# Patient Record
Sex: MALE | Race: WHITE | Employment: FULL TIME | URBAN - METROPOLITAN AREA
[De-identification: names, ages, dates, MRNs, and addresses within clinical notes are randomized per-mention and may not be internally consistent; named-entity substitution may affect disease eponyms.]

---
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
## 2018-08-21 ENCOUNTER — OFFICE VISIT (OUTPATIENT)
Dept: FAMILY MEDICINE | Facility: OTHER | Age: 18
End: 2018-08-21
Payer: COMMERCIAL

## 2018-08-21 ENCOUNTER — TELEPHONE (OUTPATIENT)
Dept: FAMILY MEDICINE | Facility: OTHER | Age: 18
End: 2018-08-21

## 2018-08-21 VITALS
HEART RATE: 64 BPM | TEMPERATURE: 97.5 F | DIASTOLIC BLOOD PRESSURE: 62 MMHG | HEIGHT: 68 IN | BODY MASS INDEX: 32.02 KG/M2 | WEIGHT: 211.3 LBS | SYSTOLIC BLOOD PRESSURE: 104 MMHG | RESPIRATION RATE: 16 BRPM

## 2018-08-21 DIAGNOSIS — S43.431D TEAR OF RIGHT GLENOID LABRUM, SUBSEQUENT ENCOUNTER: ICD-10-CM

## 2018-08-21 DIAGNOSIS — K21.00 GASTROESOPHAGEAL REFLUX DISEASE WITH ESOPHAGITIS: ICD-10-CM

## 2018-08-21 DIAGNOSIS — Z01.818 PREOP GENERAL PHYSICAL EXAM: Primary | ICD-10-CM

## 2018-08-21 DIAGNOSIS — Z11.3 SCREENING EXAMINATION FOR VENEREAL DISEASE: ICD-10-CM

## 2018-08-21 LAB
ALBUMIN UR-MCNC: NEGATIVE MG/DL
APPEARANCE UR: CLEAR
BILIRUB UR QL STRIP: NEGATIVE
COLOR UR AUTO: YELLOW
GLUCOSE UR STRIP-MCNC: NEGATIVE MG/DL
HGB BLD-MCNC: 15 G/DL (ref 13.3–17.7)
HGB UR QL STRIP: NEGATIVE
KETONES UR STRIP-MCNC: NEGATIVE MG/DL
LEUKOCYTE ESTERASE UR QL STRIP: NEGATIVE
NITRATE UR QL: NEGATIVE
PH UR STRIP: 7 PH (ref 5–7)
SOURCE: NORMAL
SP GR UR STRIP: 1.02 (ref 1–1.03)
UROBILINOGEN UR STRIP-ACNC: 0.2 EU/DL (ref 0.2–1)

## 2018-08-21 PROCEDURE — 36415 COLL VENOUS BLD VENIPUNCTURE: CPT | Performed by: STUDENT IN AN ORGANIZED HEALTH CARE EDUCATION/TRAINING PROGRAM

## 2018-08-21 PROCEDURE — 85018 HEMOGLOBIN: CPT | Performed by: STUDENT IN AN ORGANIZED HEALTH CARE EDUCATION/TRAINING PROGRAM

## 2018-08-21 PROCEDURE — 81003 URINALYSIS AUTO W/O SCOPE: CPT | Performed by: STUDENT IN AN ORGANIZED HEALTH CARE EDUCATION/TRAINING PROGRAM

## 2018-08-21 PROCEDURE — 87491 CHLMYD TRACH DNA AMP PROBE: CPT | Performed by: STUDENT IN AN ORGANIZED HEALTH CARE EDUCATION/TRAINING PROGRAM

## 2018-08-21 PROCEDURE — 99214 OFFICE O/P EST MOD 30 MIN: CPT | Performed by: STUDENT IN AN ORGANIZED HEALTH CARE EDUCATION/TRAINING PROGRAM

## 2018-08-21 PROCEDURE — 87591 N.GONORRHOEAE DNA AMP PROB: CPT | Performed by: STUDENT IN AN ORGANIZED HEALTH CARE EDUCATION/TRAINING PROGRAM

## 2018-08-21 RX ORDER — OMEPRAZOLE 40 MG/1
40 CAPSULE, DELAYED RELEASE ORAL DAILY
Qty: 90 CAPSULE | Refills: 1 | COMMUNITY
Start: 2018-08-21 | End: 2019-09-16

## 2018-08-21 ASSESSMENT — PAIN SCALES - GENERAL: PAINLEVEL: SEVERE PAIN (6)

## 2018-08-21 NOTE — LETTER
My Depression Action Plan  Name: Alf Multani   Date of Birth 2000  Date: 8/20/2018    My doctor: Alhaji Madison   My clinic: [facility]  [address] [place] [address] [address] [phone]          GREEN    ZONE   Good Control    What it looks like:     Things are going generally well. You have normal up s and down s. You may even feel depressed from time to time, but bad moods usually last less than a day.   What you need to do:  1. Continue to care for yourself (see self care plan)  2. Check your depression survival kit and update it as needed  3. Follow your physician s recommendations including any medication.  4. Do not stop taking medication unless you consult with your physician first.           YELLOW         ZONE Getting Worse    What it looks like:     Depression is starting to interfere with your life.     It may be hard to get out of bed; you may be starting to isolate yourself from others.    Symptoms of depression are starting to last most all day and this has happened for several days.     You may have suicidal thoughts but they are not constant.   What you need to do:     1. Call your care team, your response to treatment will improve if you keep your care team informed of your progress. Yellow periods are signs an adjustment may need to be made.     2. Continue your self-care, even if you have to fake it!    3. Talk to someone in your support network    4. Open up your depression survival kit           RED    ZONE Medical Alert - Get Help    What it looks like:     Depression is seriously interfering with your life.     You may experience these or other symptoms: You can t get out of bed most days, can t work or engage in other necessary activities, you have trouble taking care of basic hygiene, or basic responsibilities, thoughts of suicide or death that will not go away, self-injurious behavior.     What you need to do:  1. Call your care  team and request a same-day appointment. If they are not available (weekends or after hours) call your local crisis line, emergency room or 911.            Depression Self Care Plan / Survival Kit    Self-Care for Depression  Here s the deal. Your body and mind are really not as separate as most people think.  What you do and think affects how you feel and how you feel influences what you do and think. This means if you do things that people who feel good do, it will help you feel better.  Sometimes this is all it takes.  There is also a place for medication and therapy depending on how severe your depression is, so be sure to consult with your medical provider and/ or Behavioral Health Consultant if your symptoms are worsening or not improving.     In order to better manage my stress, I will:    Exercise  Get some form of exercise, every day. This will help reduce pain and release endorphins, the  feel good  chemicals in your brain. This is almost as good as taking antidepressants!  This is not the same as joining a gym and then never going! (they count on that by the way ) It can be as simple as just going for a walk or doing some gardening, anything that will get you moving.      Hygiene   Maintain good hygiene (Get out of bed in the morning, Make your bed, Brush your teeth, Take a shower, and Get dressed like you were going to work, even if you are unemployed).  If your clothes don't fit try to get ones that do.    Diet  I will strive to eat foods that are good for me, drink plenty of water, and avoid excessive sugar, caffeine, alcohol, and other mood-altering substances.  Some foods that are helpful in depression are: complex carbohydrates, B vitamins, flaxseed, fish or fish oil, fresh fruits and vegetables.    Psychotherapy  I agree to participate in Individual Therapy (if recommended).    Medication  If prescribed medications, I agree to take them.  Missing doses can result in serious side effects.  I  understand that drinking alcohol, or other illicit drug use, may cause potential side effects.  I will not stop my medication abruptly without first discussing it with my provider.    Staying Connected With Others  I will stay in touch with my friends, family members, and my primary care provider/team.    Use your imagination  Be creative.  We all have a creative side; it doesn t matter if it s oil painting, sand castles, or mud pies! This will also kick up the endorphins.    Witness Beauty  (AKA stop and smell the roses) Take a look outside, even in mid-winter. Notice colors, textures. Watch the squirrels and birds.     Service to others  Be of service to others.  There is always someone else in need.  By helping others we can  get out of ourselves  and remember the really important things.  This also provides opportunities for practicing all the other parts of the program.    Humor  Laugh and be silly!  Adjust your TV habits for less news and crime-drama and more comedy.    Control your stress  Try breathing deep, massage therapy, biofeedback, and meditation. Find time to relax each day.     My support system    Clinic Contact:  Phone number:    Contact 1:  Phone number:    Contact 2:  Phone number:    Uatsdin/:  Phone number:    Therapist:  Phone number:    Local crisis center:    Phone number:    Other community support:  Phone number:

## 2018-08-21 NOTE — MR AVS SNAPSHOT
After Visit Summary   8/21/2018    Alf Multani    MRN: [id number]           Patient Information     Date Of Birth          2000        Visit Information        Provider Department      8/21/2018 8:20 AM Laura England APRN CNP [facility]        Today's Diagnoses     Preop general physical exam    -  1    Tear of right glenoid labrum, subsequent encounter        Screening examination for venereal disease        Gastroesophageal reflux disease with esophagitis          Care Instructions      Before Your Surgery      Call your surgeon if there is any change in your health. This includes signs of a cold or flu (such as a sore throat, runny nose, cough, rash or fever).    Do not smoke, drink alcohol or take over the counter medicine (unless your surgeon or primary care doctor tells you to) for the 24 hours before and after surgery.    If you take prescribed drugs: Follow your doctor s orders about which medicines to take and which to stop until after surgery.    Eating and drinking prior to surgery: follow the instructions from your surgeon    Take a shower or bath the night before surgery. Use the soap your surgeon gave you to gently clean your skin. If you do not have soap from your surgeon, use your regular soap. Do not shave or scrub the surgery site.  Wear clean pajamas and have clean sheets on your bed.           Follow-ups after your visit        Who to contact     If you have questions or need follow up information about today's clinic visit or your schedule please contact [facility] directly at [phone].  Normal or non-critical lab and imaging results will be communicated to you by MyChart, letter or phone within 4 business days after the clinic has received the results. If you do not hear from us within 7 days, please contact the clinic through MyChart or phone. If you have a critical or abnormal lab result, we will notify you by phone  "as soon as possible.  Submit refill requests through [employer] or call your pharmacy and they will forward the refill request to us. Please allow 3 business days for your refill to be completed.          Additional Information About Your Visit        Care EveryWhere ID     This is your Care EveryWhere ID. This could be used by other organizations to access your [facility] medical records  [id number]        Your Vitals Were     Pulse Temperature Respirations Height BMI (Body Mass Index)       64 97.5  F (36.4  C) (Temporal) 16 5' 8.35\" (1.736 m) 31.8 kg/m2        Blood Pressure from Last 3 Encounters:   08/21/18 104/62   09/08/16 114/60   08/26/16 122/80    Weight from Last 3 Encounters:   08/21/18 211 lb 4.8 oz (95.8 kg) (95 %)*   09/08/16 178 lb 8 oz (81 kg) (85 %)*   08/26/16 178 lb (80.7 kg) (85 %)*     * Growth percentiles are based on [facility] 2-20 Years data.              We Performed the Following     *UA reflex to Microscopic and Culture ([facility] (except [place] and [place])     CHLAMYDIA TRACHOMATIS PCR     Hemoglobin     NEISSERIA GONORRHOEA PCR        Primary Care Provider Office Phone # Fax #    Alhaji Los Madison,  [phone]       [address] [place] [address]        Equal Access to Services     MARISSA GRANT : Hadii rita verma hadasho Soomaali, waaxda luqadaha, qaybta kaalmada matteo, fili stewart. So [facility] [phone].    ATENCIÓN: Si habla español, tiene a beasley disposición servicios gratuitos de asistencia lingüística. Hakan al [phone].    We comply with applicable federal civil rights laws and [place] laws. We do not discriminate on the basis of race, color, national origin, age, disability, sex, sexual orientation, or gender identity.            Thank you!     Thank you for choosing [facility]  for your care. Our goal is always to provide you with excellent care. Hearing back from our patients is one " way we can continue to improve our services. Please take a few minutes to complete the written survey that you may receive in the mail after your visit with us. Thank you!             Your Updated Medication List - Protect others around you: Learn how to safely use, store and throw away your medicines at www.disposemymeds.org.          This list is accurate as of 8/21/18 10:35 AM.  Always use your most recent med list.                   Brand Name Dispense Instructions for use Diagnosis    omeprazole 40 MG capsule    priLOSEC    90 capsule    Take 1 capsule (40 mg) by mouth daily Take 30-60 minutes before a meal.    Gastroesophageal reflux disease with esophagitis

## 2018-08-22 LAB
C TRACH DNA SPEC QL NAA+PROBE: NEGATIVE
N GONORRHOEA DNA SPEC QL NAA+PROBE: NEGATIVE
SPECIMEN SOURCE: NORMAL
SPECIMEN SOURCE: NORMAL

## 2019-08-18 ENCOUNTER — TRANSFERRED RECORDS (OUTPATIENT)
Dept: HEALTH INFORMATION MANAGEMENT | Facility: CLINIC | Age: 19
End: 2019-08-18

## 2019-09-16 ENCOUNTER — HOSPITAL ENCOUNTER (EMERGENCY)
Facility: CLINIC | Age: 19
Discharge: HOME | End: 2019-09-17
Attending: FAMILY MEDICINE | Admitting: FAMILY MEDICINE
Payer: COMMERCIAL

## 2019-09-16 VITALS
DIASTOLIC BLOOD PRESSURE: 87 MMHG | TEMPERATURE: 99 F | SYSTOLIC BLOOD PRESSURE: 165 MMHG | RESPIRATION RATE: 16 BRPM | OXYGEN SATURATION: 100 %

## 2019-09-16 DIAGNOSIS — R36.1 HEMATOSPERMIA: ICD-10-CM

## 2019-09-16 PROCEDURE — 99282 EMERGENCY DEPT VISIT SF MDM: CPT | Mod: Z6 | Performed by: FAMILY MEDICINE

## 2019-09-16 PROCEDURE — 99283 EMERGENCY DEPT VISIT LOW MDM: CPT | Performed by: FAMILY MEDICINE

## 2019-09-16 NOTE — ED AVS SNAPSHOT
[facility] Emergency Department  [address] [place] [address]  Phone:  [phone]  Fax:  [phone]                                    Alf Multani   MRN: [id number]    Department:  [facility] Emergency Department   Date of Visit:  9/16/2019           After Visit Summary Signature Page    I have received my discharge instructions, and my questions have been answered. I have discussed any challenges I see with this plan with the nurse or doctor.    ..........................................................................................................................................  Patient/Patient Representative Signature      ..........................................................................................................................................  Patient Representative Print Name and Relationship to Patient    ..................................................               ................................................  Date                                   Time    ..........................................................................................................................................  Reviewed by Signature/Title    ...................................................              ..............................................  Date                                               Time          22EPIC Rev 08/18

## 2019-09-16 NOTE — LETTER
[facility]  Emergency Room  [address] [place]   [address]  Tel: [phone]   Fax: [phone]  2019    Alf Multani  [address]  [place] [facility] [address]  [phone] (home)     : 2000          To Whom it May Concern:    Alf Multani was seen in our ER tnight, 2019. He may return to workl, without restriction, when improved.      Please contact me for questions or concerns.    Sincerely,       Jc Pearson MD

## 2019-09-17 LAB
ALBUMIN UR-MCNC: NEGATIVE MG/DL
APPEARANCE UR: CLEAR
BILIRUB UR QL STRIP: NEGATIVE
COLOR UR AUTO: YELLOW
GLUCOSE UR STRIP-MCNC: NEGATIVE MG/DL
HGB UR QL STRIP: NEGATIVE
KETONES UR STRIP-MCNC: NEGATIVE MG/DL
LEUKOCYTE ESTERASE UR QL STRIP: NEGATIVE
NITRATE UR QL: NEGATIVE
PH UR STRIP: 6 PH (ref 5–7)
SOURCE: NORMAL
SP GR UR STRIP: 1.02 (ref 1–1.03)
UROBILINOGEN UR STRIP-MCNC: 0 MG/DL (ref 0–2)

## 2019-09-17 PROCEDURE — 81003 URINALYSIS AUTO W/O SCOPE: CPT | Performed by: FAMILY MEDICINE

## 2019-09-17 PROCEDURE — 87086 URINE CULTURE/COLONY COUNT: CPT | Performed by: FAMILY MEDICINE

## 2019-09-17 NOTE — ED PROVIDER NOTES
ED Provider Note     Alf Multani  [id number]  September 17, 2019      CC:     Chief Complaint   Patient presents with     see note          History is obtained from the patient.    HPI: Alf Multani is a 19 year old male presenting with 2 episodes of hematospermia with initial episode last October, and again last night.  Patient was masturbating, and had a streak of blood with his semen.  It was not painful, and he has no other symptoms.  He denies any urinary symptoms such as dysuria or frequency.  He has no prior history of prostate problems.  Patient is sexually active with a prior history of 5-10 partners in the past.  He has no prior history of STDs.    PMH/Problem List:   Past Medical History:   Diagnosis Date     Acute appendicitis      ADHD (attention deficit hyperactivity disorder)      Anxiety        PSH:   Past Surgical History:   Procedure Laterality Date     LAPAROSCOPIC APPENDECTOMY  5/28/2012    Procedure:LAPAROSCOPIC APPENDECTOMY; Laparoscopic Appendectomy; Surgeon:INOCENCIO WOODSON; Location: OR     ORTHOPEDIC SURGERY         MEDS:     No current facility-administered medications on file prior to encounter.   Current Outpatient Medications on File Prior to Encounter:  Pantoprazole Sodium (PROTONIX PO)        Allergies: Penicillins    Triage and nursing notes were reviewed.    ROS: All other systems were reviewed and are negative    Physical Exam:  Vitals:    09/16/19 2316   BP: (!) 165/87   Resp: 16   Temp: 99  F (37.2  C)   TempSrc: Oral   SpO2: 100%     GENERAL APPEARANCE: Alert and oriented x3, slightly anxious, no apparent distress  HEAD: atraumatic  EYES: PER  HENT: Normal external exam  NECK: no adenopathy or masses, trachea is midline  RESP: lungs clear to auscultation - no rales, rhonchi or wheezes  CV: regular rate and rhythm, no significant murmurs or rubs  ABDOMEN: soft, nontender, no masses with normal bowel  sounds  : Normal external male genitalia; no discharge no genital lesions; testes are normal.  No signs of hernia  SKIN: no rash; as above      Labs/Imaging Results:  Results for orders placed or performed during the hospital encounter of 09/16/19 (from the past 24 hour(s))   UA reflex to Microscopic   Result Value Ref Range    Color Urine Yellow     Appearance Urine Clear     Glucose Urine Negative NEG^Negative mg/dL    Bilirubin Urine Negative NEG^Negative    Ketones Urine Negative NEG^Negative mg/dL    Specific Gravity Urine 1.024 1.003 - 1.035    Blood Urine Negative NEG^Negative    pH Urine 6.0 5.0 - 7.0 pH    Protein Albumin Urine Negative NEG^Negative mg/dL    Urobilinogen mg/dL 0.0 0.0 - 2.0 mg/dL    Nitrite Urine Negative NEG^Negative    Leukocyte Esterase Urine Negative NEG^Negative    Source Midstream Urine            Impression:  Final diagnoses:   Hematospermia         ED Course & Medical Decision Making (Plan):  Alf Multani is a 19 year old male with 2 episodes of hematospermia with initial episode last October and again last night.  Patient was embarrassed to come in, but decided to come in tonight to get checked.  He was masturbating both times.  He has a prior history of multiple sexual partners with women.  Patient has no prior history of STDs.  He has no other symptoms including urinary symptoms.  Patient was seen shortly after arrival.  Vital signs were normal.  Blood pressure elevated 165/87.  Patient appears slightly anxious.   exam was unremarkable.  Urinalysis was normal.  Patient was reassured that the streaks of blood with his ejaculate his likely benign given the lack of any other history and physical findings.  I would like him to monitor for any further symptoms or associated symptoms.  Follow-up with his primary care provider as needed.    Written after-visit summary and instructions were given at the time of discharge.          Follow Up Plan:  Alhaji Madison,  DO  [address] [place] [address]   [facility] [address]  [phone]      As needed            Disclaimer: This note consists of words and symbols derived from keyboarding and dictation using voice recognition software.  As a result, there may be errors that have gone undetected.  Please consider this when interpreting information found in this note.       Ken Pearson MD  09/17/19 0107

## 2019-09-17 NOTE — DISCHARGE INSTRUCTIONS
There does not appear to be a serious cause for your symptoms.  Follow-up with your primary care provider if you have any further episodes.  Return to the emergency department at any time if you develop any other symptoms.

## 2019-09-17 NOTE — ED TRIAGE NOTES
Pt presents with concerns of blood in his sperm.  Pt states that it happened on Sunday and happened one other time a while ago.  Pt states that it was not a lot of blood, but is concerned.  Denies pain.  Denies difficulty with urination.

## 2019-09-18 ENCOUNTER — TELEPHONE (OUTPATIENT)
Dept: FAMILY MEDICINE | Facility: OTHER | Age: 19
End: 2019-09-18

## 2019-09-18 LAB
BACTERIA SPEC CULT: NO GROWTH
Lab: NORMAL
SPECIMEN SOURCE: NORMAL

## 2019-09-18 NOTE — TELEPHONE ENCOUNTER
"Reason for Call:  Same Day Appointment, Requested Provider:  Alhaji Madison DO    PCP: Alhaji Madison    Reason for visit: ER f/u \"personal\"     Duration of symptoms: Was in the ER Monday     Have you been treated for this in the past? Yes    Additional comments: Pt is hoping to be worked in today. Please call.     Can we leave a detailed message on this number? YES    Phone number patient can be reached at: Home number on file [phone] (home)    Best Time: any     Call taken on 9/18/2019 at 11:49 AM by Christy Louis    "

## 2019-09-18 NOTE — TELEPHONE ENCOUNTER
Spoke with patient and informed of message below. Appointment made for tomorrow.     Little Scherer MA

## 2019-09-18 NOTE — TELEPHONE ENCOUNTER
"I am afraid I do not have any openings today to accommodate his \"special problem\".  Please try to place him in 1 of the available slots tomorrow.    Thank you.    Gricelda"

## 2019-09-19 ENCOUNTER — OFFICE VISIT (OUTPATIENT)
Dept: FAMILY MEDICINE | Facility: OTHER | Age: 19
End: 2019-09-19
Payer: COMMERCIAL

## 2019-09-19 VITALS
RESPIRATION RATE: 18 BRPM | TEMPERATURE: 98.6 F | HEIGHT: 69 IN | OXYGEN SATURATION: 98 % | HEART RATE: 86 BPM | SYSTOLIC BLOOD PRESSURE: 126 MMHG | BODY MASS INDEX: 28.93 KG/M2 | WEIGHT: 195.3 LBS | DIASTOLIC BLOOD PRESSURE: 68 MMHG

## 2019-09-19 DIAGNOSIS — R36.1 HEMATOSPERMIA: Primary | ICD-10-CM

## 2019-09-19 PROCEDURE — 99213 OFFICE O/P EST LOW 20 MIN: CPT | Performed by: INTERNAL MEDICINE

## 2019-09-19 ASSESSMENT — MIFFLIN-ST. JEOR: SCORE: 1878.07

## 2019-09-19 ASSESSMENT — PAIN SCALES - GENERAL: PAINLEVEL: NO PAIN (0)

## 2019-09-19 NOTE — LETTER
[facility]  [address] [facility] [address]  Phone: [phone]    September 19, 2019        Alf Multani  [address]  [facility] [address]          To whom it may concern:    RE: Alf Multani    Patient was seen and treated today at our clinic and missed work yesterday and today.    He can return on 9/20/19 without restrictions.    Please contact me for questions or concerns.      Sincerely,        Alhaji Madison, DO

## 2019-09-19 NOTE — PROGRESS NOTES
Subjective     Alf Multani is a 19 year old male who presents to clinic today for the following health issues:    HPI   ED/UC Followup:    Facility:  [facility]   Date of visit: 9/16/2019  Reason for visit: Hematospermia   Current Status: Still has pain at times                          Chief Complaint         The patient is applying a pleasant 19-year-old male who is seen for ER follow-up.  He was recently seen for auto induced manipulative penile trauma which resulted in a small quantity of hematospermia.  He describes it is a mild streak on the lateral edge of his ejaculate and only on 2 occasions.  He notes that as of late he has been increasing his frequency of auto manipulation and this may be partly causative.  He has not had any spontaneous bleeding or discoloration of the undergarments or blood in the urine.  He denies frequency, urgency or dysuria but notes that it does temporarily delay his initial stream.  This has been a phenomenon for the last couple days.  He denies any change in partners (or partners) and has had no other trauma or injury.                       PAST, FAMILY,SOCIAL HISTORY:     Medical  History:   has a past medical history of Acute appendicitis, ADHD (attention deficit hyperactivity disorder), and Anxiety.     Surgical History:   has a past surgical history that includes Laparoscopic appendectomy (5/28/2012) and orthopedic surgery.     Social History:   reports that he has quit smoking. He has never used smokeless tobacco. He reports that he does not drink alcohol or use drugs.     Family History:  family history includes Psychotic Disorder in his father.            MEDICATIONS  Current Outpatient Medications   Medication Sig Dispense Refill     Pantoprazole Sodium (PROTONIX PO)            --------------------------------------------------------------------------------------------------------------------                              Review of Systems       LUNGS: Pt denies:  cough, excess sputum, hemoptysis, or shortness of breath.   HEART: Pt denies: chest pain, arrhythmia, syncope, tachy or bradyarrhythmia.   GI: Pt denies: nausea, vomiting, diarrhea, constipation, melena, or hematochezia.   NEURO: Pt denies: seizures, strokes, diplopia, weakness, paraesthesias, or paralysis.   SKIN: Pt denies: itching, rashes, discoloration, or specific lesions of concern. Denies recent hair loss.   PSYCH: The patient denies significant depression, anxiety, mood imbalance. Specifically denies any suicidal ideation.                                     Examination       Constitutional: The patient appears to be in no acute distress. The patient appears to be adequately hydrated. No acute respiratory or hemodynamic distress is noted at this time.   LUNGS: clear bilaterally, airflow is brisk, no intercostal retraction or stridor is noted. No coughing is noted during visit.   HEART:  regular without rubs, clicks, gallops, or murmurs. PMI is nondisplaced. Upstrokes are brisk. S1,S2 are heard.   GI: Abdomen is soft, without rebound, guarding or tenderness. Bowel sounds are appropriate. No renal bruits are heard.    GENITAL: Normal male genitalia is noted. Testicles are bilaterally present. No penile lesions are present.                                                Decision Making  1. Hematospermia  Recommend ibuprofen 2 pills 3 times daily for 3 to 5 days for minor urethral irritation  Recommend a respite from auto manipulation for a week or so  If symptoms recur, patient will notify me (not at the moment, but the next day)            I have carefully explained the diagnosis and treatment options to the patient.  The patient has displayed an understanding of the above, and all subsequent questions were answered.        DO OLIVA Ramos    Portions of this note were produced using [employer]  Although every attempt at real-time proof reading has been made, occasional grammar/syntax  errors may have been missed.

## 2019-09-21 ENCOUNTER — TELEPHONE (OUTPATIENT)
Dept: FAMILY MEDICINE | Facility: OTHER | Age: 19
End: 2019-09-21

## 2019-09-21 DIAGNOSIS — R11.0 NAUSEA: Primary | ICD-10-CM

## 2019-09-21 NOTE — TELEPHONE ENCOUNTER
Reason for call:  Other   Patient called regarding (reason for call): prescription  Additional comments: Patient wants ondansetron prescription 4 mg. Had prescription in [place] looking to continue.  Pharmacy [facility]    Phone number to reach patient:  Home number on file [phone] (home)    Best Time:  anytime    Can we leave a detailed message on this number?  YES

## 2019-09-21 NOTE — LETTER
"[facility]  [address] [facility] [address] [phone]        September 25, 2019    Alf Multani  [address]  [facility] [address]          Dear Alf,    We have attempted to reach you by phone in regards to your care. Generally speaking, the chronic use of antiemetics without a specific reason is contraindicated.  If the patient is having \"stomach problems\" that are not responding to proton pump inhibitors, I recommend to further work-up should be performed.  He should follow-up with his preferred provider of choice for this.  In the meantime I have called over a short course of the medication but will not be refilling it until work-up has been performed.         Sincerely,        Alhaji Madison, DO                  "

## 2019-09-23 NOTE — TELEPHONE ENCOUNTER
I have attempted to contact this patient by phone with the following results: left message to return my call on answering machine.    When he returns call, please ask patient why he was given Zofran, obviously for nausea but why is he needing more?     Yanet Dickey MA     9/23/2019

## 2019-09-24 RX ORDER — ONDANSETRON 4 MG/1
4 TABLET, ORALLY DISINTEGRATING ORAL EVERY 8 HOURS PRN
Qty: 12 TABLET | Refills: 0 | Status: SHIPPED | OUTPATIENT
Start: 2019-09-24 | End: 2019-11-12

## 2019-09-24 NOTE — TELEPHONE ENCOUNTER
Called and LM for patient to call back. Please relay message to patient below.     Little Scherer MA

## 2019-09-24 NOTE — TELEPHONE ENCOUNTER
2nd attempt called and LM for patient to call back. Please relay message below.     Little Scherer MA

## 2019-09-24 NOTE — TELEPHONE ENCOUNTER
"Generally speaking, the chronic use of antiemetics without a specific reason is contraindicated.  If the patient is having \"stomach problems\" that are not responding to proton pump inhibitors, I recommend to further work-up should be performed.  He should follow-up with his preferred provider of choice for this.  In the meantime I have called over a short course of the medication but will not be refilling it until work-up has been performed.    Gricelda  "

## 2019-09-24 NOTE — TELEPHONE ENCOUNTER
Patient called back, relayed message above.   Patient states he takes medication for his stomach and if that doesn't work- he will take zofran to help make sure he doesn't throw up. Patient states he's been having issues with his stomach and takes antacids but they don't always work.        Laine Asif ~ Patient Representative  [address] [address]  [email or web address]  [email or web address]  Office:  [phone]  Fax:  [phone]

## 2019-09-25 NOTE — TELEPHONE ENCOUNTER
2nd attempt called and LM for patient to call back. Please relay below. Letter sent to patient as well.     Little Scherer MA

## 2019-10-13 ENCOUNTER — HOSPITAL ENCOUNTER (EMERGENCY)
Facility: CLINIC | Age: 19
Discharge: HOME | End: 2019-10-13
Attending: FAMILY MEDICINE | Admitting: FAMILY MEDICINE
Payer: COMMERCIAL

## 2019-10-13 VITALS
HEART RATE: 80 BPM | DIASTOLIC BLOOD PRESSURE: 80 MMHG | TEMPERATURE: 98.7 F | SYSTOLIC BLOOD PRESSURE: 129 MMHG | OXYGEN SATURATION: 99 % | RESPIRATION RATE: 20 BRPM

## 2019-10-13 DIAGNOSIS — M25.50 ARTHRALGIA, UNSPECIFIED JOINT: ICD-10-CM

## 2019-10-13 DIAGNOSIS — G47.00 INSOMNIA, UNSPECIFIED TYPE: ICD-10-CM

## 2019-10-13 LAB
ALBUMIN SERPL-MCNC: 3.8 G/DL (ref 3.4–5)
ALP SERPL-CCNC: 74 U/L (ref 40–150)
ALT SERPL W P-5'-P-CCNC: 23 U/L (ref 0–70)
ANION GAP SERPL CALCULATED.3IONS-SCNC: 7 MMOL/L (ref 3–14)
AST SERPL W P-5'-P-CCNC: 16 U/L (ref 0–45)
BASOPHILS # BLD AUTO: 0 10E9/L (ref 0–0.2)
BASOPHILS NFR BLD AUTO: 0.3 %
BILIRUB SERPL-MCNC: 0.2 MG/DL (ref 0.2–1.3)
BUN SERPL-MCNC: 20 MG/DL (ref 7–30)
CALCIUM SERPL-MCNC: 9.1 MG/DL (ref 8.5–10.1)
CHLORIDE SERPL-SCNC: 108 MMOL/L (ref 94–109)
CO2 SERPL-SCNC: 26 MMOL/L (ref 20–32)
CREAT SERPL-MCNC: 0.75 MG/DL (ref 0.66–1.25)
CRP SERPL-MCNC: 3.1 MG/L (ref 0–8)
DIFFERENTIAL METHOD BLD: NORMAL
EOSINOPHIL NFR BLD AUTO: 1.4 %
ERYTHROCYTE [DISTWIDTH] IN BLOOD BY AUTOMATED COUNT: 12.3 % (ref 10–15)
ERYTHROCYTE [SEDIMENTATION RATE] IN BLOOD BY WESTERGREN METHOD: 7 MM/H (ref 0–15)
GFR SERPL CREATININE-BSD FRML MDRD: >90 ML/MIN/{1.73_M2}
GLUCOSE SERPL-MCNC: 123 MG/DL (ref 70–99)
HCT VFR BLD AUTO: 40.3 % (ref 40–53)
HGB BLD-MCNC: 13.7 G/DL (ref 13.3–17.7)
IMM GRANULOCYTES # BLD: 0.1 10E9/L (ref 0–0.4)
IMM GRANULOCYTES NFR BLD: 0.6 %
LYMPHOCYTES # BLD AUTO: 3 10E9/L (ref 0.8–5.3)
LYMPHOCYTES NFR BLD AUTO: 29.7 %
MCH RBC QN AUTO: 28.3 PG (ref 26.5–33)
MCHC RBC AUTO-ENTMCNC: 34 G/DL (ref 31.5–36.5)
MCV RBC AUTO: 83 FL (ref 78–100)
MONOCYTES # BLD AUTO: 0.7 10E9/L (ref 0–1.3)
MONOCYTES NFR BLD AUTO: 7.1 %
NEUTROPHILS # BLD AUTO: 6.1 10E9/L (ref 1.6–8.3)
NEUTROPHILS NFR BLD AUTO: 60.9 %
NRBC # BLD AUTO: 0 10*3/UL
NRBC BLD AUTO-RTO: 0 /100
PLATELET # BLD AUTO: 288 10E9/L (ref 150–450)
POTASSIUM SERPL-SCNC: 3.7 MMOL/L (ref 3.4–5.3)
PROT SERPL-MCNC: 7.2 G/DL (ref 6.8–8.8)
RBC # BLD AUTO: 4.84 10E12/L (ref 4.4–5.9)
SODIUM SERPL-SCNC: 141 MMOL/L (ref 133–144)
WBC # BLD AUTO: 10 10E9/L (ref 4–11)

## 2019-10-13 PROCEDURE — 80053 COMPREHEN METABOLIC PANEL: CPT | Performed by: FAMILY MEDICINE

## 2019-10-13 PROCEDURE — 85652 RBC SED RATE AUTOMATED: CPT | Performed by: FAMILY MEDICINE

## 2019-10-13 PROCEDURE — 96374 THER/PROPH/DIAG INJ IV PUSH: CPT | Performed by: FAMILY MEDICINE

## 2019-10-13 PROCEDURE — 85025 COMPLETE CBC W/AUTO DIFF WBC: CPT | Performed by: FAMILY MEDICINE

## 2019-10-13 PROCEDURE — 99284 EMERGENCY DEPT VISIT MOD MDM: CPT | Mod: Z6 | Performed by: FAMILY MEDICINE

## 2019-10-13 PROCEDURE — 99284 EMERGENCY DEPT VISIT MOD MDM: CPT | Mod: 25 | Performed by: FAMILY MEDICINE

## 2019-10-13 PROCEDURE — 86140 C-REACTIVE PROTEIN: CPT | Performed by: FAMILY MEDICINE

## 2019-10-13 PROCEDURE — 25000128 H RX IP 250 OP 636: Performed by: FAMILY MEDICINE

## 2019-10-13 PROCEDURE — 25000132 ZZH RX MED GY IP 250 OP 250 PS 637: Performed by: EMERGENCY MEDICINE

## 2019-10-13 RX ORDER — KETOROLAC TROMETHAMINE 30 MG/ML
30 INJECTION, SOLUTION INTRAMUSCULAR; INTRAVENOUS ONCE
Status: COMPLETED | OUTPATIENT
Start: 2019-10-13 | End: 2019-10-13

## 2019-10-13 RX ORDER — OLANZAPINE 2.5 MG/1
10 TABLET, FILM COATED ORAL ONCE
Status: COMPLETED | OUTPATIENT
Start: 2019-10-13 | End: 2019-10-13

## 2019-10-13 RX ORDER — ZOLPIDEM TARTRATE 10 MG/1
10 TABLET ORAL
Qty: 15 TABLET | Refills: 0 | Status: SHIPPED | OUTPATIENT
Start: 2019-10-13

## 2019-10-13 RX ADMIN — KETOROLAC TROMETHAMINE 30 MG: 30 INJECTION, SOLUTION INTRAMUSCULAR at 07:07

## 2019-10-13 RX ADMIN — OLANZAPINE 10 MG: 2.5 TABLET, FILM COATED ORAL at 07:36

## 2019-10-13 NOTE — ED PROVIDER NOTES
"                                                            [facility] ED Provider Note   Patient: Alf Multani  MRN #:  [id number]  Date of Visit: October 13, 2019    CC:     Chief Complaint   Patient presents with     Pain     HPI:  Alf Multani is a 19 year old male who presented to the emergency department with insomnia, muscle tension, and joint pains.  Patient reports that he has not been able to sleep for the last 2 nights.  He has a lot on his mind, and feels that he may be having a flareup of his anxiety.  Patient states that he has \"a lot of my plate\", and his mind has been racing during the day.  Tonight he started to develop bilateral joint pains in his knuckles, elbows, knees and ankles, with the worst joints involving the elbows and knees.  He has not noticed any swelling, redness or heat, and has not had any previous episodes of this in the past.  Patient took some ibuprofen yesterday afternoon and last night, and a dose of Benadryl 2 hours ago.  He has no prior history of uriel, and states that he has been treated in the past for depression and anxiety with medication such as Prozac and Zoloft.  These did not help as he had hoped it would but he is no longer on it.  Patient denies any illicit drug use, alcohol or tobacco.  He was recently seen in the emergency department several weeks ago with hematospermia.    Problem List:  Patient Active Problem List    Diagnosis Date Noted     Acute bronchospasm 04/21/2016     Priority: Medium     Health Care Home 04/16/2015     Priority: Medium       Status:  Unable to reach  Care Coordinator:  Sylwia Allan    See Letters for [facility] Care Plan  Date:  April 16, 2015       Major depressive disorder, single episode, mild (H) 11/03/2014     Priority: Medium     Generalized anxiety disorder 11/03/2014     Priority: Medium     Marijuana abuse 10/28/2014     Priority: Medium     Depression 10/28/2014     Priority: Medium     School failure " 12/07/2013     Priority: Medium     ADHD (attention deficit hyperactivity disorder) 12/07/2013     Priority: Medium     Date diagnosed: 10/2/13  Diagnosed by:  [facility] by Anastasiya Asif PsyD  Medications tried and any medication reactions: Concerta 36mg  _________________________________  Last office visit for ADHD:  3/13/14  Current medication and dose:  Concerta 36mg  Next visit due:  Sept  Next Marla/Lázaro due: Sept           Past Medical History:   Diagnosis Date     Acute appendicitis      ADHD (attention deficit hyperactivity disorder)      Anxiety        MEDS: Pantoprazole Sodium (PROTONIX PO)  ondansetron (ZOFRAN-ODT) 4 MG ODT tab        ALLERGIES:    Allergies   Allergen Reactions     Penicillins      Rash       Past Surgical History:   Procedure Laterality Date     LAPAROSCOPIC APPENDECTOMY  5/28/2012    Procedure:LAPAROSCOPIC APPENDECTOMY; Laparoscopic Appendectomy; Surgeon:INOCENCIO WOODSON; Location:[facility] OR     ORTHOPEDIC SURGERY         Social History     Tobacco Use     Smoking status: Former Smoker     Smokeless tobacco: Never Used   Substance Use Topics     Alcohol use: No     Alcohol/week: 0.0 standard drinks     Drug use: No     Comment: daily         Review of Systems   Except as noted in HPI, all other systems were reviewed and are negative    Physical Exam     Vitals were reviewed  Patient Vitals for the past 12 hrs:   BP Temp Temp src Heart Rate Resp SpO2   10/13/19 0715 -- 98.7  F (37.1  C) -- -- -- --   10/13/19 0635 137/89 97.6  F (36.4  C) Oral 70 20 99 %     GENERAL APPEARANCE: Alert and oriented x3, no distress  FACE: normal facies  EYES: Pupils are equal  HENT: normal external exam  NECK: no adenopathy or asymmetry  RESP: normal respiratory effort; clear breath sounds bilaterally  CV: regular rate and rhythm; no significant murmurs, gallops or rubs  ABD: soft, no tenderness; no rebound or guarding; bowel sounds are normal  EXT: No calf tenderness or pitting edema; no  signs of active synovitis; no joint effusions  SKIN: no worrisome rash  NEURO: no facial droop; no focal deficits, speech is normal  PSYCH: Patient endorses feelings of anxiety      Available Lab/Imaging Results     Pending       Impression     Final diagnoses:   Insomnia, unspecified type   Arthralgia, multiple joints         ED Course & Medical Decision Making   Alf Multani is a 19 year old male who presented to the emergency department with insomnia, with symptoms that may be due to stress related anxiety.  Patient has no previous history of uriel, and he admits that he is under a a lot of stress lately.  Since last night he started to develop bilateral joint pains involving the knuckles, elbows, knees, and ankles.  He has no prior history of an autoimmune disease.  There has been no associated rash, fever, etc.  Patient was seen shortly after arrival.  Temperature is 97.6, blood pressure 137/89, heart rate of 70.  Exam of the joints are unremarkable.  There is no signs of active synovitis.  A work-up was initiated and include a CBC, competency metabolic panel, CRP and ESR.  Patient will receive a dose of Toradol 30 mg IV.  Patient will be signed out to Dr. Vallejo at the change of shift pending results of his work-up.  We discussed the possibility of being sent home with medications for his insomnia for 2 to 3 days until he can follow-up in the clinic.         Disclaimer: This note consists of words and symbols derived from keyboarding and dictation using voice recognition software.  As a result, there may be errors that have gone undetected.  Please consider this when interpreting information found in this note.       Ken Pearson MD  10/13/19 0746

## 2019-10-13 NOTE — ED PROVIDER NOTES
19-year-old male signed over to me at change of shift.  He was initially seen by Dr. Pearson for insomnia, body aches, joint pains, tension.  He had not slept in a couple of nights.  He felt agitated with racing thoughts but no hallucinations.  He has a history of anxiety and depression.  He tried ibuprofen and Benadryl without improvement.  He denied drug use alcohol or tobacco.    Initially he did not have a ride so nothing was given to him that was sedating.  He was anxious and agitated and eventually was able to get a ride from his mother.  At that point I decided to give him Zyprexa, 10 mg orally.  He was given IV Toradol by Dr. Pearson for his body aches and labs were drawn.  I reviewed those labs.    I saw and examined the patient.  He seemed to be doing fairly well.  He was a little bit agitated.  I sent him home with a prescription for a few tablets of Ambien after a risk-benefit discussion and I have asked him to follow-up with his primary care provider.     Shekhar Vallejo MD  10/13/19 0973

## 2019-10-13 NOTE — ED TRIAGE NOTES
Reports feeling anxious on Friday and was unable to sleep, then on Saturday night he states he developed aching all over and has still been unable to sleep. Pt states he took aleve yesterday about 1400 and then Benadryl at about 0400 this morning.

## 2019-10-13 NOTE — DISCHARGE INSTRUCTIONS
Return to the emergency department if you develop new or worsening symptoms.  Please follow-up with your doctor regarding your body aches and insomnia.  It would be a good idea to try meditation to help in your insomnia problem and your anxiety.  I hope you get better quickly.  Do not drive if taking Ambien.

## 2019-10-13 NOTE — ED AVS SNAPSHOT
[facility] Emergency Department  [address] [place] [address]  Phone:  [phone]  Fax:  [phone]                                    Alf Multani   MRN: [id number]    Department:  [facility] Emergency Department   Date of Visit:  10/13/2019           After Visit Summary Signature Page    I have received my discharge instructions, and my questions have been answered. I have discussed any challenges I see with this plan with the nurse or doctor.    ..........................................................................................................................................  Patient/Patient Representative Signature      ..........................................................................................................................................  Patient Representative Print Name and Relationship to Patient    ..................................................               ................................................  Date                                   Time    ..........................................................................................................................................  Reviewed by Signature/Title    ...................................................              ..............................................  Date                                               Time          22EPIC Rev 08/18

## 2019-10-17 ENCOUNTER — HOSPITAL ENCOUNTER (EMERGENCY)
Facility: CLINIC | Age: 19
Discharge: HOME | End: 2019-10-17
Attending: EMERGENCY MEDICINE | Admitting: EMERGENCY MEDICINE

## 2019-10-17 ENCOUNTER — OFFICE VISIT (OUTPATIENT)
Dept: FAMILY MEDICINE | Facility: OTHER | Age: 19
End: 2019-10-17
Payer: COMMERCIAL

## 2019-10-17 VITALS
BODY MASS INDEX: 29.41 KG/M2 | WEIGHT: 198 LBS | SYSTOLIC BLOOD PRESSURE: 110 MMHG | HEART RATE: 70 BPM | RESPIRATION RATE: 16 BRPM | OXYGEN SATURATION: 99 % | DIASTOLIC BLOOD PRESSURE: 62 MMHG

## 2019-10-17 VITALS — OXYGEN SATURATION: 100 % | HEART RATE: 66 BPM | SYSTOLIC BLOOD PRESSURE: 127 MMHG | DIASTOLIC BLOOD PRESSURE: 78 MMHG
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
## 2019-10-17 DIAGNOSIS — S46.812A TRAPEZIUS STRAIN, LEFT, INITIAL ENCOUNTER: ICD-10-CM

## 2019-10-17 DIAGNOSIS — S06.0X0A CONCUSSION WITHOUT LOSS OF CONSCIOUSNESS, INITIAL ENCOUNTER: Primary | ICD-10-CM

## 2019-10-17 PROCEDURE — 99282 EMERGENCY DEPT VISIT SF MDM: CPT | Performed by: EMERGENCY MEDICINE

## 2019-10-17 PROCEDURE — 99214 OFFICE O/P EST MOD 30 MIN: CPT | Performed by: PHYSICIAN ASSISTANT

## 2019-10-17 PROCEDURE — 99283 EMERGENCY DEPT VISIT LOW MDM: CPT | Mod: Z6 | Performed by: EMERGENCY MEDICINE

## 2019-10-17 RX ORDER — IBUPROFEN 200 MG
400 TABLET ORAL EVERY 4 HOURS PRN
COMMUNITY

## 2019-10-17 ASSESSMENT — ENCOUNTER SYMPTOMS
SHORTNESS OF BREATH: 0
FEVER: 0
ABDOMINAL PAIN: 0

## 2019-10-17 NOTE — ED AVS SNAPSHOT
[facility] Emergency Department  [address] [place] [address]  Phone:  [phone]  Fax:  [phone]                                    Alf Multani   MRN: [id number]    Department:  [facility] Emergency Department   Date of Visit:  10/17/2019           After Visit Summary Signature Page    I have received my discharge instructions, and my questions have been answered. I have discussed any challenges I see with this plan with the nurse or doctor.    ..........................................................................................................................................  Patient/Patient Representative Signature      ..........................................................................................................................................  Patient Representative Print Name and Relationship to Patient    ..................................................               ................................................  Date                                   Time    ..........................................................................................................................................  Reviewed by Signature/Title    ...................................................              ..............................................  Date                                               Time          22EPIC Rev 08/18

## 2019-10-17 NOTE — DISCHARGE INSTRUCTIONS
Fortunately your medical assessment from his work that injury shows no evidence for concussion or closed head injury.  Noted was spasm with strain to the left trapezius muscle.  Would recommend applying ice for 30 minutes 3-4 times today and using ibuprofen as needed for headache and muscle soreness.  He may return to work tomorrow without any restrictions.  Mother should be observing for any cognitive change such as confusion, vomiting(sign of increased intracranial pressure), loss of coordination.  If any of the symptoms are observed please return to the emergency department for reassessment.

## 2019-10-17 NOTE — ED TRIAGE NOTES
"A \"huge\" Plywood door fell on his head at work this morning.  Seen at [facility] and referred to ED.  Work Comp: [employer]  "

## 2019-10-17 NOTE — ED PROVIDER NOTES
History     Chief Complaint   Patient presents with     Head Injury     HPI  Alf Multani is a 19 year old male who presents for work-related injury.  He is employed in [employer].  A large plywood door used on the back of a pickup truck to capture yard cleanup fell striking him.  It initially did not knock him off his feet but he fell to ground a few seconds after being hit.  His primary complaint is headache rated 3/10 intensity and some left paracervical/left shoulder discomfort.  He has had no nausea or vomiting.  No change in balance or coordination.  Denies any visual complaints.  Notes no increased discomfort with cervical range of motion or movement of his left shoulder.  Points over left trapezius muscle as the region of soreness.    Allergies:  Allergies   Allergen Reactions     Penicillins      Rash       Problem List:    Patient Active Problem List    Diagnosis Date Noted     Acute bronchospasm 04/21/2016     Priority: Medium     Health Care Home 04/16/2015     Priority: Medium       Status:  Unable to reach  Care Coordinator:  Sylwia Allan    See Letters for H Care Plan  Date:  April 16, 2015       Major depressive disorder, single episode, mild (H) 11/03/2014     Priority: Medium     Generalized anxiety disorder 11/03/2014     Priority: Medium     Marijuana abuse 10/28/2014     Priority: Medium     Depression 10/28/2014     Priority: Medium     School failure 12/07/2013     Priority: Medium     ADHD (attention deficit hyperactivity disorder) 12/07/2013     Priority: Medium     Date diagnosed: 10/2/13  Diagnosed by:  [facility] by Anastasiya Asif PsyD  Medications tried and any medication reactions: Concerta 36mg  _________________________________  Last office visit for ADHD:  3/13/14  Current medication and dose:  Concerta 36mg  Next visit due:  Sept  Next Marla/Lázaro due: Sept            Past Medical History:    Past Medical History:   Diagnosis Date     Acute  appendicitis      ADHD (attention deficit hyperactivity disorder)      Anxiety        Past Surgical History:    Past Surgical History:   Procedure Laterality Date     LAPAROSCOPIC APPENDECTOMY  5/28/2012    Procedure:LAPAROSCOPIC APPENDECTOMY; Laparoscopic Appendectomy; Surgeon:INOCENCIO WOODSON; Location: OR     ORTHOPEDIC SURGERY         Family History:    Family History   Problem Relation Age of Onset     Psychotic Disorder Father         bipolar       Social History:  Marital Status:  Single [1]  Social History     Tobacco Use     Smoking status: Former Smoker     Packs/day: 0.00     Smokeless tobacco: Never Used   Substance Use Topics     Alcohol use: Yes     Alcohol/week: 0.0 standard drinks     Comment: occ     Drug use: No     Comment: medical marijuana        Medications:    ibuprofen (ADVIL/MOTRIN) 200 MG tablet  ondansetron (ZOFRAN-ODT) 4 MG ODT tab  Pantoprazole Sodium (PROTONIX PO)  zolpidem (AMBIEN) 10 MG tablet          Review of Systems   Constitutional: Negative for fever.   Respiratory: Negative for shortness of breath.    Cardiovascular: Negative for chest pain.   Gastrointestinal: Negative for abdominal pain.   All other systems reviewed and are negative.      Physical Exam   BP: 117/63  Heart Rate: 67  Resp: 16  Weight: 89.8 kg (198 lb)  SpO2: 100 %      Physical Exam  Constitutional:       General: He is not in acute distress.     Appearance: He is not diaphoretic.   HENT:      Head: Atraumatic.   Eyes:      Pupils: Pupils are equal, round, and reactive to light.   Cardiovascular:      Rate and Rhythm: Regular rhythm.      Heart sounds: Normal heart sounds.   Pulmonary:      Effort: No respiratory distress.      Breath sounds: Normal breath sounds.   Chest:      Chest wall: No tenderness.   Abdominal:      General: Bowel sounds are normal.      Palpations: Abdomen is soft.      Tenderness: There is no tenderness.   Musculoskeletal: Normal range of motion.         General: Tenderness  present.      Cervical back: He exhibits no tenderness.        Thoracic back: He exhibits no tenderness.      Lumbar back: He exhibits no tenderness.      Comments: Soft tissue tenderness isolated to the left trapezius muscle.  Did not extend into the paracervical soft tissue.  He had no discomfort palpating the cervical spine midline and he was able to clear C-spine per Nexus criteria.  There is no distracting injuries or alcohol that would limit.   Skin:     Findings: No abrasion or laceration.   Neurological:      Mental Status: He is alert and oriented to person, place, and time.      Cranial Nerves: Cranial nerves are intact.      Sensory: Sensation is intact.      Motor: Motor function is intact.      Coordination: Coordination is intact. Romberg sign negative. Coordination normal. Finger-Nose-Finger Test and Heel to Shin Test normal.      Gait: Gait is intact. Tandem walk normal.      Deep Tendon Reflexes: Babinski sign absent on the right side. Babinski sign absent on the left side.      Reflex Scores:       Tricep reflexes are 3+ on the right side and 3+ on the left side.       Bicep reflexes are 2+ on the right side and 2+ on the left side.       Brachioradialis reflexes are 1+ on the right side and 1+ on the left side.       Patellar reflexes are 2+ on the right side and 2+ on the left side.       Achilles reflexes are 1+ on the right side and 1+ on the left side.        ED Course        Procedures              Assessments & Plan (with Medical Decision Making)  19-year-old male presents for work-related injury.  He had a heavy plywood door fell off a pickup truck.  It was constructed to hold leaves in the back of the pickup truck bed.  Hit him on the left side.  Did not knock him off his feet.  After he being hit in deflecting the plywood he then fell to the ground.  He is complaining of left periscapular discomfort.  He points left trapezius muscle region.  Examination noted a normal neurological  assessment.  Muscular skeletal examination was able to clear C-spine per Nexus criteria.  He had tenderness with some spasticity in the left trapezius muscle only.  This did not affect cervical range of motion noted in fact function as was left shoulder and upper extremity.  There is no indication for any advanced neurologic imaging  Patient be discharged home.  Instructed to ice the left trap region 30 minutes 4 times today.  May return to work tomorrow without restrictions.       I have reviewed the nursing notes.    I have reviewed the findings, diagnosis, plan and need for follow up with the patient.      New Prescriptions    No medications on file       Final diagnoses:   Work related injury   Trapezius strain, left, initial encounter       10/17/2019   [facility] EMERGENCY DEPARTMENT     Shekhar Asif,   10/17/19 1032

## 2019-10-17 NOTE — LETTER
October 17, 2019      To Whom It May Concern:      Alf Multani was seen in our Emergency Department today, 10/17/19.   Patient was evaluated for work-related injury that occurred earlier this morning after heavy door fell on top of him.  Fortunately he did not sustain a concussion or closed head injury.  Does have a strain to the left trapezius muscle with some acute spasm.  He was advised not to return to work today to allow for icing.  He may return to work tomorrow without restrictions.      Sincerely,        Shekhar Asif Dr. [facility] ED Staff Physician

## 2019-10-17 NOTE — PROGRESS NOTES
"Subjective     Alf Multani is a 19 year old male who presents to clinic today for the following health issues:    HPI     Nursing Note: Patient walked into clinic this morning after hitting his head at work. He reports moving a large wooden door and it swinging down and hitting him on the left side of the head, knocking him to the ground. He denies losing consciousness. He reports vomiting immediately afterwards but also reports having \"stomache issues\" and that he had been vomiting all morning. He reports ongoing and increasing dizziness, slightly blurred vision, nausea, fatigue, headache, and a throbbing at the impact site. He drove himself to clinic and is clearly unsteady when walking and getting in and out of the wheelchair. He has had 2-3 previous concussions, most recently in 2016/2017.    He denies any extremity weakness, facial droop, confusion or lethargy. He took 200 mg of ibuprofen after the incident 20 minutes ago without any relief as of yet. He states he has chronic stomach issues due to acid reflux and has intermittent vomiting from this.     Reviewed and updated as needed this visit by Provider   Allergies  Meds  Problems  Med Hx     Review of Systems   GENERAL: Denies fever, fatigue, weakness, weight gain, or weight loss.  CARDIOVASCULAR: Denies chest pain, shortness of breath, irregular heartbeats, palpitations, or edema.  RESPIRATORY: Denies cough, hemoptysis, and shortness of breath.  GASTROINTESTINAL: +Heartburn, nausea and vomiting.  Denies change in appetite, abdominal pain, diarrhea, or constipation.  MUSCULOSKELETAL: +Left side head pain.    NEUROLOGIC: +Headache, dizziness, blurred vision. Denies fainting, memory loss, numbness, tingling, or seizures.  PSYCHIATRIC: Denies depression, anxiety, mood swings, and thoughts of suicide.      Objective    /78   Pulse 66   SpO2 100%   There is no height or weight on file to calculate BMI.  Physical Exam   GENERAL: alert but " appears slightly uncomfortable due to pain  RESP: lungs clear to auscultation - no rales, rhonchi or wheezes  CV: regular rate and rhythm, normal S1 S2, no S3 or S4, no murmur, click or rub,  NEURO: Normal strength and tone, mentation intact and speech normal. He is alert and oriented x3. Cranial nerves II-XII are grossly intact with normal pupillary response bilaterally. DTRs are 3+/4 throughout and brisk but symmetric. Villatoro sign positive bilaterally. No clonus. Gait is slightly ataxic, especially when turning around and he describes dizziness.  Swaying during Romberg testing.   PSYCH: mentation appears normal, affect normal/bright  MUSCULOSKELETAL: Mild tenderness over left posteroparietal scalp without an obvious bump/swelling. No bleeding.         Assessment & Plan       ICD-10-CM    1. Concussion without loss of consciousness, initial encounter S06.0X0A         Closed head injury approximately 20 minutes PTA consistent with a concussion. He is alert and oriented and is speaking in normal, coherent sentences but his gait is ataxic with brisk reflexes throughout and positive Villatoro sign on exam. He also endorses dizziness and blurred vision so given his neurological symptoms, exam findings and history of previous concussions, I feel it would be best that he be seen in the ED for more thorough evaluation and head CT. I do not believe an ambulance is necessary and he states his mother can pick him up and drive him to [place]. I spoke with an ED physician at [facility] regarding patient and they are expecting him.    Jr Juárez PA-C  [facility]

## 2019-10-31 ENCOUNTER — TELEPHONE (OUTPATIENT)
Dept: FAMILY MEDICINE | Facility: OTHER | Age: 19
End: 2019-10-31

## 2019-10-31 NOTE — TELEPHONE ENCOUNTER
Called [facility], they will be contacting the patient as we do not have anyone on staff here in [place] that manages Soboxone Treatment.    Noris Jacobsen XRO/

## 2019-10-31 NOTE — TELEPHONE ENCOUNTER
Reason for Call:  Other     Detailed comments: Tylor is referred to [facility] by [facility] for Suboxone treatment.  Tylor is asking if someone would get back to him asap about this.        Phone Number Patient can be reached at: Home number on file [phone] (home)    Best Time: any -     Can we leave a detailed message on this number? YES    Call taken on 10/31/2019 at 12:49 PM by Wen Root

## 2019-11-01 NOTE — TELEPHONE ENCOUNTER
Patient is not a client of [facility] but rather [facility], I called [facility] ([phone]); left a message.  We do not do Suboxone management and patient would need to be referred somewhere else.    I received a call back from [facility]; and the patient is NOT a client there either.  I have called the patient and left a message for him to call me back and discuss the referral.    Noris Jacobsen XRO/

## 2019-11-07 ENCOUNTER — HOSPITAL ENCOUNTER (EMERGENCY)
Facility: CLINIC | Age: 19
Discharge: HOME | End: 2019-11-07
Attending: FAMILY MEDICINE | Admitting: FAMILY MEDICINE
Payer: COMMERCIAL

## 2019-11-07 VITALS
RESPIRATION RATE: 16 BRPM | WEIGHT: 203.9 LBS | SYSTOLIC BLOOD PRESSURE: 117 MMHG | DIASTOLIC BLOOD PRESSURE: 72 MMHG | HEART RATE: 63 BPM | TEMPERATURE: 97.6 F | BODY MASS INDEX: 30.29 KG/M2 | OXYGEN SATURATION: 100 %

## 2019-11-07 DIAGNOSIS — R11.2 NAUSEA AND VOMITING, INTRACTABILITY OF VOMITING NOT SPECIFIED, UNSPECIFIED VOMITING TYPE: ICD-10-CM

## 2019-11-07 DIAGNOSIS — F13.10 BENZODIAZEPINE ABUSE, EPISODIC (H): ICD-10-CM

## 2019-11-07 DIAGNOSIS — F15.10 METHAMPHETAMINE ABUSE (H): ICD-10-CM

## 2019-11-07 DIAGNOSIS — F19.959: ICD-10-CM

## 2019-11-07 PROCEDURE — 96361 HYDRATE IV INFUSION ADD-ON: CPT | Performed by: FAMILY MEDICINE

## 2019-11-07 PROCEDURE — 25000128 H RX IP 250 OP 636: Performed by: FAMILY MEDICINE

## 2019-11-07 PROCEDURE — 96374 THER/PROPH/DIAG INJ IV PUSH: CPT | Performed by: FAMILY MEDICINE

## 2019-11-07 PROCEDURE — 99284 EMERGENCY DEPT VISIT MOD MDM: CPT | Mod: Z6 | Performed by: FAMILY MEDICINE

## 2019-11-07 PROCEDURE — 99284 EMERGENCY DEPT VISIT MOD MDM: CPT | Mod: 25 | Performed by: FAMILY MEDICINE

## 2019-11-07 PROCEDURE — 25800030 ZZH RX IP 258 OP 636: Performed by: FAMILY MEDICINE

## 2019-11-07 PROCEDURE — 96375 TX/PRO/DX INJ NEW DRUG ADDON: CPT | Performed by: FAMILY MEDICINE

## 2019-11-07 RX ORDER — DIPHENHYDRAMINE HYDROCHLORIDE 50 MG/ML
25 INJECTION INTRAMUSCULAR; INTRAVENOUS ONCE
Status: COMPLETED | OUTPATIENT
Start: 2019-11-07 | End: 2019-11-07

## 2019-11-07 RX ORDER — HALOPERIDOL 5 MG/ML
5 INJECTION INTRAMUSCULAR ONCE
Status: COMPLETED | OUTPATIENT
Start: 2019-11-07 | End: 2019-11-07

## 2019-11-07 RX ORDER — SODIUM CHLORIDE 9 MG/ML
1000 INJECTION, SOLUTION INTRAVENOUS CONTINUOUS
Status: DISCONTINUED | OUTPATIENT
Start: 2019-11-07 | End: 2019-11-07 | Stop reason: HOSPADM

## 2019-11-07 RX ADMIN — SODIUM CHLORIDE 1000 ML: 9 INJECTION, SOLUTION INTRAVENOUS at 03:53

## 2019-11-07 RX ADMIN — DIPHENHYDRAMINE HYDROCHLORIDE 25 MG: 50 INJECTION, SOLUTION INTRAMUSCULAR; INTRAVENOUS at 03:53

## 2019-11-07 RX ADMIN — HALOPERIDOL LACTATE 5 MG: 5 INJECTION INTRAMUSCULAR at 03:55

## 2019-11-07 ASSESSMENT — ENCOUNTER SYMPTOMS
FEVER: 0
NERVOUS/ANXIOUS: 1
VOMITING: 1
HEADACHES: 1
NAUSEA: 1
ABDOMINAL PAIN: 0
CHILLS: 0
WEAKNESS: 0
NUMBNESS: 0

## 2019-11-07 NOTE — ED AVS SNAPSHOT
[facility] Emergency Department  [address] [place] [address]  Phone:  [phone]  Fax:  [phone]                                    Alf Multani   MRN: [id number]    Department:  [facility] Emergency Department   Date of Visit:  11/7/2019           After Visit Summary Signature Page    I have received my discharge instructions, and my questions have been answered. I have discussed any challenges I see with this plan with the nurse or doctor.    ..........................................................................................................................................  Patient/Patient Representative Signature      ..........................................................................................................................................  Patient Representative Print Name and Relationship to Patient    ..................................................               ................................................  Date                                   Time    ..........................................................................................................................................  Reviewed by Signature/Title    ...................................................              ..............................................  Date                                               Time          22EPIC Rev 08/18

## 2019-11-07 NOTE — ED TRIAGE NOTES
Pt comes in with complaints of head pain after taking clonopin and using meth around 0200. Pt states he has never used meth before.

## 2019-11-07 NOTE — DISCHARGE INSTRUCTIONS
Please read and follow the handout(s) instructions. Return, if needed, for increased or worsening symptoms and as directed by the handout(s).        See the pamphlet for the [facility].

## 2019-11-07 NOTE — ED NOTES
"Pt yelled out again for help. Writer went in to room and pt states \"this IV just came out.\" Bandage placed. Pt states \"Will you tell him that this shit isn't helping my head. It's still fucking pounding.\" MD notified.   "

## 2019-11-07 NOTE — ED PROVIDER NOTES
History     Chief Complaint   Patient presents with     Headache     HPI    Alf Multani is a 19 year old male who presented to the emergency room secondary to concerns of intractable nausea and vomiting with headache symptoms.  Patient states that about 3:00 this morning he was experimenting with methamphetamine and also took some clonidine tablets.  He states almost immediately after taking the meth he developed nausea, vomiting and headache symptoms.  He states he cannot tolerate the symptoms.  Patient is pacing back and forth in the exam room.  He is actively vomiting at times in the exam room.  Patient is currently a poor historian.  Patient did state that he does use Klonopin before without any of the side effects but he is never used meth before tonight.  I asked why he is using these substances and he states that he just felt kind of down today.    Allergies:  Allergies   Allergen Reactions     Penicillins      Rash       Problem List:    Patient Active Problem List    Diagnosis Date Noted     Acute bronchospasm 04/21/2016     Priority: Medium     Health Care Home 04/16/2015     Priority: Medium       Status:  Unable to reach  Care Coordinator:  Sylwia Allan    See Letters for H Care Plan  Date:  April 16, 2015       Major depressive disorder, single episode, mild (H) 11/03/2014     Priority: Medium     Generalized anxiety disorder 11/03/2014     Priority: Medium     Marijuana abuse 10/28/2014     Priority: Medium     Depression 10/28/2014     Priority: Medium     School failure 12/07/2013     Priority: Medium     ADHD (attention deficit hyperactivity disorder) 12/07/2013     Priority: Medium     Date diagnosed: 10/2/13  Diagnosed by:  [facility] by Anastasiya Asif PsyD  Medications tried and any medication reactions: Concerta 36mg  _________________________________  Last office visit for ADHD:  3/13/14  Current medication and dose:  Concerta 36mg  Next visit due:  Sept  Next  Marla/Lázaro due: Sept            Past Medical History:    Past Medical History:   Diagnosis Date     Acute appendicitis      ADHD (attention deficit hyperactivity disorder)      Anxiety        Past Surgical History:    Past Surgical History:   Procedure Laterality Date     LAPAROSCOPIC APPENDECTOMY  5/28/2012    Procedure:LAPAROSCOPIC APPENDECTOMY; Laparoscopic Appendectomy; Surgeon:INOCENCIO WOODSON; Location: OR     ORTHOPEDIC SURGERY         Family History:    Family History   Problem Relation Age of Onset     Psychotic Disorder Father         bipolar       Social History:  Marital Status:  Single [1]  Social History     Tobacco Use     Smoking status: Former Smoker     Packs/day: 0.00     Smokeless tobacco: Never Used   Substance Use Topics     Alcohol use: Yes     Alcohol/week: 0.0 standard drinks     Comment: occ     Drug use: Yes     Types: Methamphetamines     Comment: medical marijuana        Medications:    ibuprofen (ADVIL/MOTRIN) 200 MG tablet  ondansetron (ZOFRAN-ODT) 4 MG ODT tab  Pantoprazole Sodium (PROTONIX PO)  zolpidem (AMBIEN) 10 MG tablet          Review of Systems   Unable to perform ROS: Acuity of condition   Constitutional: Negative for chills and fever.   Gastrointestinal: Positive for nausea and vomiting. Negative for abdominal pain.   Neurological: Positive for headaches. Negative for weakness and numbness.   Psychiatric/Behavioral: Positive for behavioral problems. The patient is nervous/anxious.    All other systems reviewed and are negative.      Physical Exam   BP: (!) 179/101  Pulse: 84  Heart Rate: 93  Temp: 97.6  F (36.4  C)  Resp: 16  Weight: 92.5 kg (203 lb 14.4 oz)  SpO2: 98 %      Physical Exam  Vitals signs and nursing note reviewed.   Constitutional:       General: He is in acute distress.      Comments: Patient extremely agitated consistent with acute meth amphetamine toxicity.  He is pacing back and forth in the exam room unable to sit still.  He is actively  vomiting on and off throughout the duration of the examination.  He was a poor historian secondary to flight of ideas.  No definite hallucinations identified on exam.   HENT:      Head: Atraumatic.      Right Ear: External ear normal.      Left Ear: External ear normal.      Nose: Nose normal.      Comments: No signs of facial trauma identified.  Eyes:      General: No scleral icterus.  Neck:      Musculoskeletal: Normal range of motion.   Cardiovascular:      Rate and Rhythm: Normal rate.      Pulses: Normal pulses.   Pulmonary:      Effort: Pulmonary effort is normal. No respiratory distress.   Abdominal:      Tenderness: There is no tenderness.   Musculoskeletal: Normal range of motion.   Skin:     General: Skin is warm.      Findings: No lesion or rash.   Neurological:      General: No focal deficit present.   Psychiatric:         Mood and Affect: Mood is anxious.         Speech: Speech is rapid and pressured.         Behavior: Behavior is uncooperative and agitated.         Thought Content: Thought content is paranoid. Thought content does not include homicidal or suicidal ideation.         Judgement: Judgement is impulsive.         ED Course     ED Course as of Nov 07 0727   Thu Nov 07, 2019   0449 Patient reexamined and found to be sleeping on the exam bed.  He appears to be breathing with normal respirations.      0524 Patient reexamined and was found in the bathroom urinating.  He appears in less distress and is no longer nauseated and vomiting.  He desired to return to his room to sleep.      0552 Patient reexamined and found to be sleeping.  Again, he appears to be breathing normally and I did not awaken him.        Procedures               Critical Care time:  none               Medications   0.9% sodium chloride BOLUS (0 mLs Intravenous Stopped 11/7/19 0601)     Followed by   sodium chloride 0.9% infusion (has no administration in time range)   diphenhydrAMINE (BENADRYL) injection 25 mg (25 mg  Intravenous Given 11/7/19 0353)   haloperidol lactate (HALDOL) injection 5 mg (5 mg Intravenous Given 11/7/19 0355)       Assessments & Plan (with Medical Decision Making)  19-year-old male to the emergency room secondary to concerns of headache and nausea and vomiting that started approximately 20 minutes after using methamphetamine and benzodiazepines.  Initial exam was difficult as the patient was all over the room and extremely agitated while vomiting throughout the duration of the exam.  He had no neurological deficit that was identified.  We are eventually able to get an IV established and he was given a dose of IV Haldol.  Within about 15 to 20 minutes of that dose being given the patient's symptoms resolved and he was able to rest.  He slept for several hours except for a episode of getting up to go to the bathroom.  Reexamination at the end of my shift, the patient was feeling much improved.  His headache had resolved and his nausea vomiting had resolved as well.  He was much more cooperative.  He requested to be able to sleep additionally.  I did spend some time with him discussing addiction issues and abuse issues.  He requested a prescription for Suboxone.  I gave him information about local resources for drug addiction issues including pamphlet from the [facility] in [facility].  Patient can be discharged when he can demonstrate functional capacity and safety.  Dr. Mendez was made aware of the plan at shift change.     I have reviewed the nursing notes.    I have reviewed the findings, diagnosis, plan and need for follow up with the patient.       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

## 2019-11-08 ENCOUNTER — PATIENT OUTREACH (OUTPATIENT)
Dept: CARE COORDINATION | Facility: CLINIC | Age: 19
End: 2019-11-08

## 2019-11-08 ASSESSMENT — ACTIVITIES OF DAILY LIVING (ADL): DEPENDENT_IADLS:: INDEPENDENT

## 2019-11-08 NOTE — PROGRESS NOTES
Clinic Care Coordination Contact    Clinic Care Coordination Contact  OUTREACH    Referral Information: Pt triggered on [facility] Discharge Report. High risk score of 56 and has had 5 ED visits in past 90 days.  Referral Source: IP Report    Primary Diagnosis: CD    Chief Complaint   Patient presents with     Clinic Care Coordination - Post Hospital     ED f/u- SW         [place] Utilization:   Clinic Utilization  Difficulty keeping appointments:: No  Compliance Concerns: No  No-Show Concerns: No  No PCP office visit in Past Year: No  Utilization    Last refreshed: 11/8/2019  9:24 AM:  Hospital Admissions 0           Last refreshed: 11/8/2019  9:24 AM:  ED Visits 4           Last refreshed: 11/8/2019  9:24 AM:  No Show Count (past year) 0              Current as of: 11/8/2019  9:24 AM            Clinical Concerns:  Patient Active Problem List   Diagnosis     School failure     ADHD (attention deficit hyperactivity disorder)     Marijuana abuse     Depression     Major depressive disorder, single episode, mild (H)     Generalized anxiety disorder     Acute bronchospasm       Current Medical Concerns:  Pt seen in ED r/t meth use and nausea and vomiting. Pt stating was first time was experimenting with meth. Pt requesting Suboxone. Resources given in ED for local CD assistance regarding drug addiction.   CC spoke with pt introduced self and explained role of CC. Pt states he is doing fine. Acknowledged that the ED did provide him with resources. States he has no questions and does not need assistance with anything.   Current Behavioral Concerns: not discussed    Education Provided to patient: Introduced self, explained CC role, offered resources. Pt declines.    Pain  Pain (GOAL):: No  Health Maintenance Reviewed: Due/Overdue   Health Maintenance Due   Topic Date Due     TOBACCO CESSATION COUNSELING  1998     HIV SCREENING  08/29/2013     HPV IMMUNIZATION (1 - Male 3-dose series) 08/29/2013     PHQ-9  04/28/2015      PREVENTIVE CARE VISIT  08/26/2017     INFLUENZA VACCINE (1) 09/01/2019       Clinical Pathway: None    Medication Management:  See medication list.      Functional Status:  Dependent ADLs:: Independent  Dependent IADLs:: Independent  Bed or wheelchair confined:: No  Mobility Status: Independent  Fallen 2 or more times in the past year?: No  Any fall with injury in the past year?: No    Living Situation:  Current living arrangement:: Not Assessed  Type of residence:: (unknown)    Diet/Exercise/Sleep:  Diet:: Regular  Inadequate nutrition (GOAL):: No  Food Insecurity: No  Tube Feeding: No  Exercise:: (unknown)  Inadequate activity/exercise (GOAL):: No  Significant changes in sleep pattern (GOAL): No    Transportation:  Transportation concerns (GOAL):: No  Transportation means:: Regular car     Psychosocial:  Mormonism or spiritual beliefs that impact treatment:: No     Financial/Insurance:   Financial/Insurance concerns (GOAL):: No  BCBS out of State     Resources and Interventions:  Current Resources:      Community Resources: None  Supplies used at home:: None  Equipment Currently Used at Home: none    Advance Care Plan/Directive  Advanced Care Plans/Directives on file:: No  Advanced Care Plan/Directive Status: Declined Further Information    Referrals Placed: (resources given in ED for CD pt declined any further resources)     Goals: n/a    Patient/Caregiver understanding: yes      Plan: Pt declines any resource needs or SW CC assistance. SW CC will do no further outreaches.       NORMA Duarte   Primary Care Clinic- Social Work Care Coordinator  [facility]  11/8/2019 10:04 AM  [phone]

## 2019-11-09 ENCOUNTER — TRANSFERRED RECORDS (OUTPATIENT)
Dept: HEALTH INFORMATION MANAGEMENT | Facility: CLINIC | Age: 19
End: 2019-11-09

## 2019-11-12 ENCOUNTER — TELEPHONE (OUTPATIENT)
Dept: FAMILY MEDICINE | Facility: OTHER | Age: 19
End: 2019-11-12

## 2019-11-12 ENCOUNTER — HOSPITAL ENCOUNTER (EMERGENCY)
Facility: CLINIC | Age: 19
Discharge: HOME | End: 2019-11-12
Attending: PHYSICIAN ASSISTANT | Admitting: PHYSICIAN ASSISTANT
Payer: COMMERCIAL

## 2019-11-12 ENCOUNTER — APPOINTMENT (OUTPATIENT)
Dept: MRI IMAGING | Facility: CLINIC | Age: 19
End: 2019-11-12
Attending: PHYSICIAN ASSISTANT
Payer: COMMERCIAL

## 2019-11-12 VITALS
HEART RATE: 70 BPM | SYSTOLIC BLOOD PRESSURE: 141 MMHG | WEIGHT: 195.44 LBS | RESPIRATION RATE: 18 BRPM | OXYGEN SATURATION: 100 % | DIASTOLIC BLOOD PRESSURE: 77 MMHG | BODY MASS INDEX: 28.95 KG/M2 | HEIGHT: 69 IN | TEMPERATURE: 98 F

## 2019-11-12 DIAGNOSIS — S39.012A STRAIN OF LUMBAR REGION: ICD-10-CM

## 2019-11-12 DIAGNOSIS — G43.909 MIGRAINE: ICD-10-CM

## 2019-11-12 LAB
ALBUMIN SERPL-MCNC: 4.3 G/DL (ref 3.4–5)
ALP SERPL-CCNC: 68 U/L (ref 40–150)
ALT SERPL W P-5'-P-CCNC: 30 U/L (ref 0–70)
ANION GAP SERPL CALCULATED.3IONS-SCNC: 7 MMOL/L (ref 3–14)
AST SERPL W P-5'-P-CCNC: 6 U/L (ref 0–45)
BASOPHILS # BLD AUTO: 0.1 10E9/L (ref 0–0.2)
BASOPHILS NFR BLD AUTO: 0.4 %
BILIRUB SERPL-MCNC: 0.6 MG/DL (ref 0.2–1.3)
BUN SERPL-MCNC: 10 MG/DL (ref 7–30)
CALCIUM SERPL-MCNC: 9.1 MG/DL (ref 8.5–10.1)
CHLORIDE SERPL-SCNC: 107 MMOL/L (ref 94–109)
CO2 SERPL-SCNC: 26 MMOL/L (ref 20–32)
CREAT SERPL-MCNC: 0.72 MG/DL (ref 0.66–1.25)
CRP SERPL-MCNC: 5.2 MG/L (ref 0–8)
DIFFERENTIAL METHOD BLD: ABNORMAL
EOSINOPHIL NFR BLD AUTO: 0 %
ERYTHROCYTE [DISTWIDTH] IN BLOOD BY AUTOMATED COUNT: 12.4 % (ref 10–15)
ERYTHROCYTE [SEDIMENTATION RATE] IN BLOOD BY WESTERGREN METHOD: 8 MM/H (ref 0–15)
GFR SERPL CREATININE-BSD FRML MDRD: >90 ML/MIN/{1.73_M2}
GLUCOSE SERPL-MCNC: 94 MG/DL (ref 70–99)
HCT VFR BLD AUTO: 40 % (ref 40–53)
HGB BLD-MCNC: 13.7 G/DL (ref 13.3–17.7)
IMM GRANULOCYTES # BLD: 0.1 10E9/L (ref 0–0.4)
IMM GRANULOCYTES NFR BLD: 1 %
LYMPHOCYTES # BLD AUTO: 1.5 10E9/L (ref 0.8–5.3)
LYMPHOCYTES NFR BLD AUTO: 10.9 %
MCH RBC QN AUTO: 28.1 PG (ref 26.5–33)
MCHC RBC AUTO-ENTMCNC: 34.3 G/DL (ref 31.5–36.5)
MCV RBC AUTO: 82 FL (ref 78–100)
MONOCYTES # BLD AUTO: 0.9 10E9/L (ref 0–1.3)
MONOCYTES NFR BLD AUTO: 7 %
NEUTROPHILS # BLD AUTO: 10.9 10E9/L (ref 1.6–8.3)
NEUTROPHILS NFR BLD AUTO: 80.7 %
NRBC # BLD AUTO: 0 10*3/UL
NRBC BLD AUTO-RTO: 0 /100
PLATELET # BLD AUTO: 279 10E9/L (ref 150–450)
POTASSIUM SERPL-SCNC: 3.2 MMOL/L (ref 3.4–5.3)
PROT SERPL-MCNC: 8.3 G/DL (ref 6.8–8.8)
RBC # BLD AUTO: 4.88 10E12/L (ref 4.4–5.9)
SODIUM SERPL-SCNC: 140 MMOL/L (ref 133–144)
WBC # BLD AUTO: 13.5 10E9/L (ref 4–11)

## 2019-11-12 PROCEDURE — 86618 LYME DISEASE ANTIBODY: CPT | Performed by: PHYSICIAN ASSISTANT

## 2019-11-12 PROCEDURE — 96365 THER/PROPH/DIAG IV INF INIT: CPT | Mod: 59 | Performed by: PHYSICIAN ASSISTANT

## 2019-11-12 PROCEDURE — 72158 MRI LUMBAR SPINE W/O & W/DYE: CPT

## 2019-11-12 PROCEDURE — 25500064 ZZH RX 255 OP 636: Performed by: PHYSICIAN ASSISTANT

## 2019-11-12 PROCEDURE — 80053 COMPREHEN METABOLIC PANEL: CPT | Performed by: PHYSICIAN ASSISTANT

## 2019-11-12 PROCEDURE — 86140 C-REACTIVE PROTEIN: CPT | Performed by: PHYSICIAN ASSISTANT

## 2019-11-12 PROCEDURE — 96375 TX/PRO/DX INJ NEW DRUG ADDON: CPT | Performed by: PHYSICIAN ASSISTANT

## 2019-11-12 PROCEDURE — 85025 COMPLETE CBC W/AUTO DIFF WBC: CPT | Performed by: PHYSICIAN ASSISTANT

## 2019-11-12 PROCEDURE — 99285 EMERGENCY DEPT VISIT HI MDM: CPT | Mod: Z6 | Performed by: PHYSICIAN ASSISTANT

## 2019-11-12 PROCEDURE — 85652 RBC SED RATE AUTOMATED: CPT | Performed by: PHYSICIAN ASSISTANT

## 2019-11-12 PROCEDURE — 25800030 ZZH RX IP 258 OP 636: Performed by: PHYSICIAN ASSISTANT

## 2019-11-12 PROCEDURE — A9585 GADOBUTROL INJECTION: HCPCS | Performed by: PHYSICIAN ASSISTANT

## 2019-11-12 PROCEDURE — 25000128 H RX IP 250 OP 636: Performed by: PHYSICIAN ASSISTANT

## 2019-11-12 PROCEDURE — 99285 EMERGENCY DEPT VISIT HI MDM: CPT | Mod: 25 | Performed by: PHYSICIAN ASSISTANT

## 2019-11-12 PROCEDURE — 96361 HYDRATE IV INFUSION ADD-ON: CPT | Performed by: PHYSICIAN ASSISTANT

## 2019-11-12 RX ORDER — MAGNESIUM SULFATE 1 G/100ML
1 INJECTION INTRAVENOUS ONCE
Status: COMPLETED | OUTPATIENT
Start: 2019-11-12 | End: 2019-11-12

## 2019-11-12 RX ORDER — DEXAMETHASONE SODIUM PHOSPHATE 10 MG/ML
10 INJECTION, SOLUTION INTRAMUSCULAR; INTRAVENOUS ONCE
Status: COMPLETED | OUTPATIENT
Start: 2019-11-12 | End: 2019-11-12

## 2019-11-12 RX ORDER — DIPHENHYDRAMINE HYDROCHLORIDE 50 MG/ML
25 INJECTION INTRAMUSCULAR; INTRAVENOUS ONCE
Status: COMPLETED | OUTPATIENT
Start: 2019-11-12 | End: 2019-11-12

## 2019-11-12 RX ORDER — GADOBUTROL 604.72 MG/ML
10 INJECTION INTRAVENOUS ONCE
Status: COMPLETED | OUTPATIENT
Start: 2019-11-12 | End: 2019-11-12

## 2019-11-12 RX ORDER — SODIUM CHLORIDE 9 MG/ML
1000 INJECTION, SOLUTION INTRAVENOUS CONTINUOUS
Status: DISCONTINUED | OUTPATIENT
Start: 2019-11-12 | End: 2019-11-12 | Stop reason: HOSPADM

## 2019-11-12 RX ORDER — SUMATRIPTAN 25 MG/1
TABLET, FILM COATED ORAL
Qty: 8 TABLET | Refills: 0 | Status: SHIPPED | OUTPATIENT
Start: 2019-11-12

## 2019-11-12 RX ORDER — KETOROLAC TROMETHAMINE 30 MG/ML
30 INJECTION, SOLUTION INTRAMUSCULAR; INTRAVENOUS ONCE
Status: COMPLETED | OUTPATIENT
Start: 2019-11-12 | End: 2019-11-12

## 2019-11-12 RX ORDER — HALOPERIDOL 5 MG/ML
5 INJECTION INTRAMUSCULAR ONCE
Status: COMPLETED | OUTPATIENT
Start: 2019-11-12 | End: 2019-11-12

## 2019-11-12 RX ADMIN — MAGNESIUM SULFATE HEPTAHYDRATE 1 G: 1 INJECTION, SOLUTION INTRAVENOUS at 16:48

## 2019-11-12 RX ADMIN — PROCHLORPERAZINE EDISYLATE 10 MG: 5 INJECTION INTRAMUSCULAR; INTRAVENOUS at 16:44

## 2019-11-12 RX ADMIN — SODIUM CHLORIDE 1000 ML: 9 INJECTION, SOLUTION INTRAVENOUS at 18:44

## 2019-11-12 RX ADMIN — DIPHENHYDRAMINE HYDROCHLORIDE 25 MG: 50 INJECTION, SOLUTION INTRAMUSCULAR; INTRAVENOUS at 16:38

## 2019-11-12 RX ADMIN — SODIUM CHLORIDE 1000 ML: 9 INJECTION, SOLUTION INTRAVENOUS at 16:38

## 2019-11-12 RX ADMIN — KETOROLAC TROMETHAMINE 30 MG: 30 INJECTION, SOLUTION INTRAMUSCULAR at 16:39

## 2019-11-12 RX ADMIN — DEXAMETHASONE SODIUM PHOSPHATE 10 MG: 10 INJECTION, SOLUTION INTRAMUSCULAR; INTRAVENOUS at 16:40

## 2019-11-12 RX ADMIN — GADOBUTROL 9 ML: 604.72 INJECTION INTRAVENOUS at 19:11

## 2019-11-12 RX ADMIN — HALOPERIDOL LACTATE 5 MG: 5 INJECTION INTRAMUSCULAR at 19:42

## 2019-11-12 ASSESSMENT — MIFFLIN-ST. JEOR: SCORE: 1881.88

## 2019-11-12 NOTE — TELEPHONE ENCOUNTER
Per fito Pickard to use same day spot tomorrow. I have contacted the pt and scheduled him for 9:20 am on 11/13/2019. Lizzette Hebert CMA ([facility])

## 2019-11-12 NOTE — ED TRIAGE NOTES
He has a severe headache that he has had since Saturday.  He was seen in the hospital in [place], [place] and treated and sent home.  The pain had improved but is worse today.

## 2019-11-12 NOTE — ED AVS SNAPSHOT
[facility] Emergency Department  [address] [place] [address]  Phone:  [phone]  Fax:  [phone]                                    Alf Multani   MRN: [id number]    Department:  [facility] Emergency Department   Date of Visit:  11/12/2019           After Visit Summary Signature Page    I have received my discharge instructions, and my questions have been answered. I have discussed any challenges I see with this plan with the nurse or doctor.    ..........................................................................................................................................  Patient/Patient Representative Signature      ..........................................................................................................................................  Patient Representative Print Name and Relationship to Patient    ..................................................               ................................................  Date                                   Time    ..........................................................................................................................................  Reviewed by Signature/Title    ...................................................              ..............................................  Date                                               Time          22EPIC Rev 08/18

## 2019-11-12 NOTE — TELEPHONE ENCOUNTER
Reason for Call:  Same Day Appointment, Requested Provider:  Alhaji Madison DO    PCP: Alhaji Madison    Reason for visit: emergency room follow up, on massive migraine, strained lumbar due to pain    Duration of symptoms: since last Friday 11/08/19    Have you been treated for this in the past? No    Additional comments: patient was in emergency room in [facility] for massive migraine, patient was told to see his primary doctor Monday 11/11/19, patient did not get back into town until just now. Patient calling from the airport. Please call to advise    Can we leave a detailed message on this number? Not Applicable    Phone number patient can be reached at: Cell number on file:    Telephone Information:   Mobile [phone]       Best Time: ASAP    Call taken on 11/12/2019 at 2:30 PM by Aixa Estrada

## 2019-11-13 ENCOUNTER — HOSPITAL ENCOUNTER (EMERGENCY)
Facility: CLINIC | Age: 19
Discharge: HOME | End: 2019-11-13
Attending: FAMILY MEDICINE | Admitting: FAMILY MEDICINE
Payer: COMMERCIAL

## 2019-11-13 ENCOUNTER — PATIENT OUTREACH (OUTPATIENT)
Dept: CARE COORDINATION | Facility: CLINIC | Age: 19
End: 2019-11-13

## 2019-11-13 ENCOUNTER — TELEPHONE (OUTPATIENT)
Dept: FAMILY MEDICINE | Facility: OTHER | Age: 19
End: 2019-11-13

## 2019-11-13 VITALS
DIASTOLIC BLOOD PRESSURE: 85 MMHG | TEMPERATURE: 97.8 F | OXYGEN SATURATION: 100 % | RESPIRATION RATE: 16 BRPM | HEART RATE: 87 BPM | SYSTOLIC BLOOD PRESSURE: 138 MMHG | WEIGHT: 195 LBS | BODY MASS INDEX: 28.88 KG/M2 | HEIGHT: 69 IN

## 2019-11-13 DIAGNOSIS — F11.93 HEROIN WITHDRAWAL (H): ICD-10-CM

## 2019-11-13 DIAGNOSIS — R51.9 MIXED HEADACHE: ICD-10-CM

## 2019-11-13 DIAGNOSIS — Z71.89 OTHER SPECIFIED COUNSELING: ICD-10-CM

## 2019-11-13 LAB — B BURGDOR IGG+IGM SER QL: 0.09 (ref 0–0.89)

## 2019-11-13 PROCEDURE — 99284 EMERGENCY DEPT VISIT MOD MDM: CPT | Mod: 25 | Performed by: FAMILY MEDICINE

## 2019-11-13 PROCEDURE — 96365 THER/PROPH/DIAG IV INF INIT: CPT | Performed by: FAMILY MEDICINE

## 2019-11-13 PROCEDURE — 99284 EMERGENCY DEPT VISIT MOD MDM: CPT | Mod: Z6 | Performed by: FAMILY MEDICINE

## 2019-11-13 PROCEDURE — 25000128 H RX IP 250 OP 636: Performed by: FAMILY MEDICINE

## 2019-11-13 PROCEDURE — 96375 TX/PRO/DX INJ NEW DRUG ADDON: CPT | Performed by: FAMILY MEDICINE

## 2019-11-13 PROCEDURE — 25800030 ZZH RX IP 258 OP 636: Performed by: FAMILY MEDICINE

## 2019-11-13 RX ORDER — AMITRIPTYLINE HYDROCHLORIDE 10 MG/1
TABLET ORAL
Qty: 60 TABLET | Refills: 0 | Status: SHIPPED | OUTPATIENT
Start: 2019-11-13 | End: 2019-12-03

## 2019-11-13 RX ORDER — BUPIVACAINE HYDROCHLORIDE 5 MG/ML
10 INJECTION, SOLUTION EPIDURAL; INTRACAUDAL ONCE
Status: DISCONTINUED | OUTPATIENT
Start: 2019-11-13 | End: 2019-11-14 | Stop reason: HOSPADM

## 2019-11-13 RX ORDER — KETOROLAC TROMETHAMINE 30 MG/ML
30 INJECTION, SOLUTION INTRAMUSCULAR; INTRAVENOUS ONCE
Status: COMPLETED | OUTPATIENT
Start: 2019-11-13 | End: 2019-11-13

## 2019-11-13 RX ORDER — METOCLOPRAMIDE HYDROCHLORIDE 5 MG/ML
10 INJECTION INTRAMUSCULAR; INTRAVENOUS ONCE
Status: COMPLETED | OUTPATIENT
Start: 2019-11-13 | End: 2019-11-13

## 2019-11-13 RX ORDER — DIPHENHYDRAMINE HYDROCHLORIDE 50 MG/ML
25 INJECTION INTRAMUSCULAR; INTRAVENOUS ONCE
Status: COMPLETED | OUTPATIENT
Start: 2019-11-13 | End: 2019-11-13

## 2019-11-13 RX ORDER — MAGNESIUM SULFATE 1 G/100ML
1 INJECTION INTRAVENOUS ONCE
Status: COMPLETED | OUTPATIENT
Start: 2019-11-13 | End: 2019-11-13

## 2019-11-13 RX ORDER — SODIUM CHLORIDE 9 MG/ML
1000 INJECTION, SOLUTION INTRAVENOUS CONTINUOUS
Status: DISCONTINUED | OUTPATIENT
Start: 2019-11-13 | End: 2019-11-14 | Stop reason: HOSPADM

## 2019-11-13 RX ADMIN — KETOROLAC TROMETHAMINE 30 MG: 30 INJECTION, SOLUTION INTRAMUSCULAR at 21:32

## 2019-11-13 RX ADMIN — METOCLOPRAMIDE 10 MG: 5 INJECTION, SOLUTION INTRAMUSCULAR; INTRAVENOUS at 21:33

## 2019-11-13 RX ADMIN — SODIUM CHLORIDE 1000 ML: 9 INJECTION, SOLUTION INTRAVENOUS at 21:30

## 2019-11-13 RX ADMIN — MAGNESIUM SULFATE HEPTAHYDRATE 1 G: 1 INJECTION, SOLUTION INTRAVENOUS at 21:37

## 2019-11-13 RX ADMIN — DIPHENHYDRAMINE HYDROCHLORIDE 50 MG: 50 INJECTION, SOLUTION INTRAMUSCULAR; INTRAVENOUS at 21:30

## 2019-11-13 ASSESSMENT — ENCOUNTER SYMPTOMS
FEVER: 0
DIARRHEA: 1
NAUSEA: 1

## 2019-11-13 ASSESSMENT — MIFFLIN-ST. JEOR: SCORE: 1879.89

## 2019-11-13 NOTE — PROGRESS NOTES
"Patient was seen once.  Somehow I was declared as his \"PCP\".  Has subsequently no showed appointments.  I will not be able to accommodate any orders until he is actually seen and I can determine what his medical needs are as his initial visit did not clearly demonstrate any needs.    AKA: If I am your doctor, you need to be seen, I need to know why we are doing this, and then I will write the orders.    Mean, old, Dr. Madison        "

## 2019-11-13 NOTE — PROGRESS NOTES
After chart reviewing I noticed the patient was in the emergency on 11/7/2019 and the CC  reached out and offered our services. At that time the patient declined Care Coordination services, so because of this I will not be reaching out to this patient at this time.       Referral: ED discharge report.  Patient was seen for a headache

## 2019-11-13 NOTE — DISCHARGE INSTRUCTIONS
It was a pleasure working with you today!  I hope the migraine stays away this time!  I am thankful that you are feeling much better after today's treatments.  If you get a repeat migraine, it is okay to try the Imitrex.    Please follow-up with Dr. Madison the end of this week or early next week to recheck your condition.    Please stop using methamphetamine, as this can be a potential trigger for headaches.    Please make sure that you are drinking at least 8 to 10 glasses of water daily to maintain adequate hydration.    Rest your back.  Do not lift anything over 5 pounds for the next few days.  It is okay to do stretches.  Use heat on the back for 20 minutes every couple hours to help with discomfort.  You can also use a Lidoderm patch which are over-the-counter patches you can buy to help with the discomfort.

## 2019-11-13 NOTE — ED PROVIDER NOTES
"  History     Chief Complaint   Patient presents with     Headache     HPI  Alf Multani is a 19 year old male who presents for evaluation of an ongoing headache which she reports is been ongoing for the past 4 days.  He was down in [facility] for vacation visiting family when this first occurred.  He was evaluated in the emergency department there and underwent CT of the head and also an LP.  He was given a treatment regimen for this headache and discharged home.  He states that his symptoms did not improve and then he started to develop the low back pain.  He was seen in the ED again for this and discharged home after being treated with pain medication.  Patient reports that he continues to have the headache.  Reports that this is the worst headache of his life.  Starts more in the bifrontal area with radiation up around the top of the head and into the posterior occipital area.  Describes it as a throbbing pain rated 10 on a scale of 10 and not improving with ibuprofen and Excedrin Migraine.  He took Excedrin Migraine earlier, but his last dose of ibuprofen was yesterday.  Patient also states that he has suffered with insomnia, feeling jittery, restless, episodes of blurred vision, nausea, and 2 episodes of vomiting earlier today.  Patient states he flew back from [place] today.  Patient denies having any postural change to the headache.  His head hurts just as bad in the supine position as it does when he is sitting up.  Headache nature has not changed since getting the LP, therefore less likely that this is a spinal leak headache.  Mother has a family history of migraines.  Patient admits to smoking marijuana on a daily basis which he states that he does for \"medicinal reasons \".  Patient did not immediately open up about this, but does admit to injectable methamphetamine use as well.  Patient reports low back pain which has been persistent and very severe.  Denies any radicular symptoms in " the lower extremities.  Denies any lower extremity weakness, numbness, or tingling.  No loss of bowel/bladder function.  Reports that he feels chilled, but has not had a fever.  Denies any rashes on his body.  Denies any swelling over the lumbar spine.        Allergies:  Allergies   Allergen Reactions     Penicillins      Rash       Problem List:    Patient Active Problem List    Diagnosis Date Noted     Acute bronchospasm 04/21/2016     Priority: Medium     Major depressive disorder, single episode, mild (H) 11/03/2014     Priority: Medium     Generalized anxiety disorder 11/03/2014     Priority: Medium     Marijuana abuse 10/28/2014     Priority: Medium     Depression 10/28/2014     Priority: Medium     School failure 12/07/2013     Priority: Medium     ADHD (attention deficit hyperactivity disorder) 12/07/2013     Priority: Medium     Date diagnosed: 10/2/13  Diagnosed by:  [facility] by Anastasiya Asif PsyD  Medications tried and any medication reactions: Concerta 36mg  _________________________________  Last office visit for ADHD:  3/13/14  Current medication and dose:  Concerta 36mg  Next visit due:  Sept  Next [place] due: Sept            Past Medical History:    Past Medical History:   Diagnosis Date     Acute appendicitis      ADHD (attention deficit hyperactivity disorder)      Anxiety        Past Surgical History:    Past Surgical History:   Procedure Laterality Date     LAPAROSCOPIC APPENDECTOMY  5/28/2012    Procedure:LAPAROSCOPIC APPENDECTOMY; Laparoscopic Appendectomy; Surgeon:INOCENCIO WOODSON; Location: OR     ORTHOPEDIC SURGERY         Family History:    Family History   Problem Relation Age of Onset     Psychotic Disorder Father         bipolar       Social History:  Marital Status:  Single [1]  Social History     Tobacco Use     Smoking status: Former Smoker     Packs/day: 0.00     Smokeless tobacco: Never Used   Substance Use Topics     Alcohol use: Yes     Alcohol/week:  "0.0 standard drinks     Comment: occ     Drug use: Yes     Types: Methamphetamines     Comment: medical marijuana        Medications:    ibuprofen (ADVIL/MOTRIN) 200 MG tablet  Pantoprazole Sodium (PROTONIX PO)  SUMAtriptan (IMITREX) 25 MG tablet  zolpidem (AMBIEN) 10 MG tablet          Review of Systems   All other systems reviewed and are negative.      Physical Exam   BP: (!) 160/78  Pulse: 61  Temp: 98  F (36.7  C)  Resp: 18  Height: 175.3 cm (5' 9\")  Weight: 88.6 kg (195 lb 7 oz)  SpO2: 100 %      Physical Exam  Vitals signs and nursing note reviewed.   Constitutional:       General: He is not in acute distress.     Appearance: He is not ill-appearing, toxic-appearing or diaphoretic.      Comments: Sitting in a dark room.  Appears to be in pain.   HENT:      Head: Normocephalic and atraumatic.      Right Ear: Tympanic membrane, ear canal and external ear normal.      Left Ear: Tympanic membrane, ear canal and external ear normal.      Nose: Nose normal. No congestion or rhinorrhea.      Mouth/Throat:      Mouth: Mucous membranes are dry.      Pharynx: No oropharyngeal exudate.   Eyes:      General: No scleral icterus.        Right eye: No discharge.         Left eye: No discharge.      Conjunctiva/sclera: Conjunctivae normal.      Pupils: Pupils are equal, round, and reactive to light.   Neck:      Musculoskeletal: Normal range of motion and neck supple. No neck rigidity or muscular tenderness.      Thyroid: No thyromegaly.      Comments: Negative Brudzinski's and Kernig sign.  No nuchal rigidity identified.  Cardiovascular:      Rate and Rhythm: Normal rate and regular rhythm.      Heart sounds: Normal heart sounds. No murmur.   Pulmonary:      Effort: Pulmonary effort is normal. No respiratory distress.      Breath sounds: Normal breath sounds. No wheezing or rales.   Chest:      Chest wall: No tenderness.   Abdominal:      General: Bowel sounds are normal. There is no distension.      Palpations: Abdomen is " soft. There is no mass.      Tenderness: There is no tenderness. There is no guarding or rebound.   Musculoskeletal: Normal range of motion.         General: Tenderness (Very tender to palpation over the L2-L4 spinous processes and paravertebral musculature.  No swelling or redness.  No sign of cellulitis or infection.) present. No swelling or deformity.      Right lower leg: No edema.      Left lower leg: No edema.   Lymphadenopathy:      Cervical: No cervical adenopathy.   Skin:     General: Skin is warm and dry.      Capillary Refill: Capillary refill takes less than 2 seconds.      Coloration: Skin is not jaundiced or pale.      Findings: No bruising, erythema, lesion or rash.   Neurological:      Mental Status: He is alert and oriented to person, place, and time.      Cranial Nerves: No cranial nerve deficit.      Comments: Neuro:  Cranial nerves II-XII grossly intact.  Romberg is steady.  Gait WNL's.  Cerebellar testing is WNL's.  Sensation is intact to light touch throughout.  Bicep, brachioradialis, patellar, and achilles DTR's 2/4 without clonus.  Equal and appropriate strength in the bilateral lower extremities.  No neurological abnormality identified.    Psychiatric:         Mood and Affect: Mood normal.         Behavior: Behavior normal.         Thought Content: Thought content normal.         Judgement: Judgment normal.         ED Course        Procedures               Critical Care time:  none               Results for orders placed or performed during the hospital encounter of 11/12/19 (from the past 24 hour(s))   CBC with platelets differential   Result Value Ref Range    WBC 13.5 (H) 4.0 - 11.0 10e9/L    RBC Count 4.88 4.4 - 5.9 10e12/L    Hemoglobin 13.7 13.3 - 17.7 g/dL    Hematocrit 40.0 40.0 - 53.0 %    MCV 82 78 - 100 fl    MCH 28.1 26.5 - 33.0 pg    MCHC 34.3 31.5 - 36.5 g/dL    RDW 12.4 10.0 - 15.0 %    Platelet Count 279 150 - 450 10e9/L    Diff Method Automated Method     % Neutrophils 80.7  %    % Lymphocytes 10.9 %    % Monocytes 7.0 %    % Eosinophils 0.0 %    % Basophils 0.4 %    % Immature Granulocytes 1.0 %    Nucleated RBCs 0 0 /100    Absolute Neutrophil 10.9 (H) 1.6 - 8.3 10e9/L    Absolute Lymphocytes 1.5 0.8 - 5.3 10e9/L    Absolute Monocytes 0.9 0.0 - 1.3 10e9/L    Absolute Basophils 0.1 0.0 - 0.2 10e9/L    Abs Immature Granulocytes 0.1 0 - 0.4 10e9/L    Absolute Nucleated RBC 0.0    Comprehensive metabolic panel   Result Value Ref Range    Sodium 140 133 - 144 mmol/L    Potassium 3.2 (L) 3.4 - 5.3 mmol/L    Chloride 107 94 - 109 mmol/L    Carbon Dioxide 26 20 - 32 mmol/L    Anion Gap 7 3 - 14 mmol/L    Glucose 94 70 - 99 mg/dL    Urea Nitrogen 10 7 - 30 mg/dL    Creatinine 0.72 0.66 - 1.25 mg/dL    GFR Estimate >90 >60 mL/min/[1.73_m2]    GFR Estimate If Black >90 >60 mL/min/[1.73_m2]    Calcium 9.1 8.5 - 10.1 mg/dL    Bilirubin Total 0.6 0.2 - 1.3 mg/dL    Albumin 4.3 3.4 - 5.0 g/dL    Protein Total 8.3 6.8 - 8.8 g/dL    Alkaline Phosphatase 68 40 - 150 U/L    ALT 30 0 - 70 U/L    AST 6 0 - 45 U/L   CRP inflammation   Result Value Ref Range    CRP Inflammation 5.2 0.0 - 8.0 mg/L   Erythrocyte sedimentation rate auto   Result Value Ref Range    Sed Rate 8 0 - 15 mm/h   Lumbar spine MRI w & w/o contrast - surgery <10yrs    Narrative    MRI LUMBAR SPINE WITH AND WITHOUT CONTRAST   11/12/2019 7:29 PM     HISTORY: Severe low back pain, IV drug use, elevated white blood cell  count, concern for epidural abscess versus other.     TECHNIQUE: Multiplanar multisequence MRI of the lumbar spine with and  without contrast. A total of 9 mL of Gadavist was injected  intravenously.    COMPARISON: None.     FINDINGS: Alignment is maintained. Bone marrow is within normal  limits. Intervertebral disc and facet joints are intact. No spinal  canal or foraminal stenosis. Conus medullaris and cauda equina are  unremarkable. Conus medullaris terminates at the level of the mid L1  vertebral body. No abnormal  enhancement. Paraspinal soft tissues are  unremarkable.      Impression    IMPRESSION:    1. Unremarkable MRI of the lumbar spine with and without contrast.  2. No evidence of spinal infection or fluid collection.    RD EDWARD MD       Medications   0.9% sodium chloride BOLUS (0 mLs Intravenous Stopped 11/12/19 1800)     Followed by   sodium chloride 0.9% infusion (has no administration in time range)   diphenhydrAMINE (BENADRYL) injection 25 mg (25 mg Intravenous Given 11/12/19 1638)   dexamethasone PF (DECADRON) injection 10 mg (10 mg Intravenous Given 11/12/19 1640)   magnesium sulfate 1 gm in 100mL D5W intermittent infusion (0 g Intravenous Stopped 11/12/19 1718)   ketorolac (TORADOL) injection 30 mg (30 mg Intravenous Given 11/12/19 1639)   prochlorperazine (COMPAZINE) injection 10 mg (10 mg Intravenous Given 11/12/19 1644)   0.9% sodium chloride BOLUS (0 mLs Intravenous Stopped 11/12/19 2047)   haloperidol lactate (HALDOL) injection 5 mg (5 mg Intravenous Given 11/12/19 1942)   gadobutrol (GADAVIST) injection 10 mL (9 mLs Intravenous Given 11/12/19 1911)       Assessments & Plan (with Medical Decision Making)     Migraine  Strain of lumbar region     19 year old male presents for evaluation of ongoing severe headache rated 10 on a scale of 10 in the bifrontal space radiating through to the back of his head associated with symptoms of insomnia, jitteriness, restlessness, blurred vision, nausea, and vomiting.  Was seen 2 times in [place] for an ED evaluation with initial improvement in symptoms, but symptoms returned.  Initially these records are not available, but by the end of his ED stay, I did receive records which reported a normal CT scan and dictation stated negative LP results.  Patient does use injectable methamphetamine and has reported chills and low back pain onset of uncertain etiology.  No recent injury.  No recent head trauma.  He does not take anticoagulant medications.  Given that CT  was performed within the past 4 days, we did not feel that it was necessary to repeat this.  On exam vital signs are stable with a blood pressure 141/77, pulse 70, temperature 98.0, and oxygen saturation 100% on room air.  Patient appears to be in acute pain.  Neurological exam is completely normal.  No nuchal rigidity.  Lumbar spine tender to palpation but no sign of infection.  No skin redness or swelling over the LP site.  Headache is not postural, therefore decreasing the likelihood of cerebrospinal fluid leak.  I was concerned regarding his IV methamphetamine abuse history and onset of severe low back pain and chills.  Patient also had recent LP which could be a source of infection.  MRI of the lumbar spine was performed to rule out epidural abscess, and thankfully this was completely negative.  IV was established and he was given 2 L of IV normal saline along with IV migraine headache protocol in the form of IV Benadryl, IV Compazine, IV Decadron, IV Toradol, and IV magnesium.  Patient reported that his pain was down to 200 scale of 10, but still had a slight headache.  Therefore, we gave him Haldol IV with complete resolution of his symptoms.  At the end of the visit, the patient was asking to go home stating that he felt great.  He was very happy with the results of his treatment.  Thankfully, this appears to just be a migraine.  Patient wondered about a as needed medication to take in the event that this were to escalate.  I did give him Imitrex with instructions on how to use this medication.  Possible side effects of medication discussed.  I strongly encouraged PCP follow-up within the next week to recheck his condition and to discuss more long-term management for this.  We had a long discussion about avoidance of methamphetamine.  Importance of pushing clear fluids to maintain adequate hydration reviewed with him.  Rest the low back and alternate ice and heat.  It is okay to use OTC muscle rub as needed.   OTC Tylenol or ibuprofen as needed for discomfort.  Indications for ED return reviewed with the patient.  He was in agreement with this plan and was suitable for discharge.  I did review his case with Dr. Monson, ED MD, given his severity of symptoms and complicated history.  He agreed with the decision not to perform any head imaging again.     I have reviewed the nursing notes.    I have reviewed the findings, diagnosis, plan and need for follow up with the patient.       Discharge Medication List as of 11/12/2019  8:54 PM      START taking these medications    Details   SUMAtriptan (IMITREX) 25 MG tablet Take 25-100mg one time. May repeat 25mg every two hours up to a maximum of 200mg in 24 hours., Disp-8 tablet, R-0, E-Prescribe             Final diagnoses:   Migraine   Strain of lumbar region       Disclaimer: This note consists of symbols derived from keyboarding, dictation and/or voice recognition software. As a result, there may be errors in the script that have gone undetected. Please consider this when interpreting information found in this chart.      11/12/2019   Gonzales Chung PA-C   [facility] EMERGENCY DEPARTMENT     Gonzales Chung PA-C  11/12/19 8916

## 2019-11-13 NOTE — ED AVS SNAPSHOT
[facility] Emergency Department  [address] [place] [address]  Phone:  [phone]  Fax:  [phone]                                    Alf Multani   MRN: [id number]    Department:  [facility] Emergency Department   Date of Visit:  11/13/2019           After Visit Summary Signature Page    I have received my discharge instructions, and my questions have been answered. I have discussed any challenges I see with this plan with the nurse or doctor.    ..........................................................................................................................................  Patient/Patient Representative Signature      ..........................................................................................................................................  Patient Representative Print Name and Relationship to Patient    ..................................................               ................................................  Date                                   Time    ..........................................................................................................................................  Reviewed by Signature/Title    ...................................................              ..............................................  Date                                               Time          22EPIC Rev 08/18

## 2019-11-14 NOTE — DISCHARGE INSTRUCTIONS
Check with your insurance company in regards to chemical dependency treatment and where you would get the best coverage.  You have gone through the worst of the heroin withdrawal.  Please do not start using again because you will just have to go through it all over again.  Ice massage as demonstrated may be helpful for the neck pain.  Start the low-dose amitriptyline tonight.  We will gradually increase that dose in hopes of preventing her headaches from recurring.  It also may help with your anxiety.  Please recheck in the clinic with your primary physician within the next 2 weeks for further dosage adjustments and reevaluation.  It was a pleasure visiting with you and your mom tonight.  I am glad you are feeling better and hope you continue to do well.    Thank you for choosing [facility]. We appreciate the opportunity to meet your urgent medical needs. Please let us know if we could have done anything to make your stay more satisfying.    After discharge, please closely monitor for any new or worsening symptoms. Return to the Emergency Department if you develop any acute worsening signs or symptoms.    If you had lab work, cultures or imaging studies done during your stay, the final results may still be pending. We will call you if your plan of care needs to change. However, if you are not improving as expected, please follow up with your primary care provider or clinic.     Start any prescription medications that were prescribed to you and take them as directed.     Please see additional handouts that may be pertinent to your condition.

## 2019-11-14 NOTE — ED PROVIDER NOTES
History     Chief Complaint   Patient presents with     Torticollis     HPI  Alf Multani is a 19 year old male who presents to the ED with left-sided neck pain and headache.  He flew to [facility] on Friday of last week and the next morning and Saturday woke with severe neck and head pain.  He was so incapacitated by this that he had to call an ambulance and was taken to a hospital.  He had a CT of his head and a spinal tap all of which were negative.  He was given a headache cocktail but did not realize any improvement that time.  He developed low back pain and was seen in the ED again for that while still down in [place].  Last night he reported that he continued to have headache and back pain which he described as the worst headache of his life.    Also is concerned that he might be withdrawing from opiates.  He has been smoking heroin for about a month.  His last use was about 6 days ago as best he can recall.  He states he used methamphetamine only once in the past.  He does admit to using some marijuana today.  He also took Ambien, Imitrex, Benadryl and Advil today without relief.  Headache is fairly diffuse and he has left-sided neck pain starting at the occiput.    In the ED last night he had an MRI of his lumbar spine which was unremarkable.  No evidence of abscess.  CRP and sed rate were normal.  His white count was up slightly at 13.5 he was given a headache protocol including IV Benadryl, Compazine, Decadron, Toradol and magnesium with good relief down to 2/10.  His headache resolved with a dose of IV Haldol.  He was given a prescription for Imitrex.  He has used it twice today.  Is never used it before.    He has looked into treatment options for his heroin use.  Is hoping to get on Suboxone.    Allergies:  Allergies   Allergen Reactions     Penicillins      Rash       Problem List:    Patient Active Problem List    Diagnosis Date Noted     Acute bronchospasm 04/21/2016      Priority: Medium     Major depressive disorder, single episode, mild (H) 11/03/2014     Priority: Medium     Generalized anxiety disorder 11/03/2014     Priority: Medium     Marijuana abuse 10/28/2014     Priority: Medium     Depression 10/28/2014     Priority: Medium     School failure 12/07/2013     Priority: Medium     ADHD (attention deficit hyperactivity disorder) 12/07/2013     Priority: Medium     Date diagnosed: 10/2/13  Diagnosed by:  [facility] by Anastasiya Asif PsyD  Medications tried and any medication reactions: Concerta 36mg  _________________________________  Last office visit for ADHD:  3/13/14  Current medication and dose:  Concerta 36mg  Next visit due:  Sept  Next Marla/Lázaro due: Sept            Past Medical History:    Past Medical History:   Diagnosis Date     Acute appendicitis      ADHD (attention deficit hyperactivity disorder)      Anxiety        Past Surgical History:    Past Surgical History:   Procedure Laterality Date     LAPAROSCOPIC APPENDECTOMY  5/28/2012    Procedure:LAPAROSCOPIC APPENDECTOMY; Laparoscopic Appendectomy; Surgeon:INOCENCIO WOODSON; Location: OR     ORTHOPEDIC SURGERY         Family History:    Family History   Problem Relation Age of Onset     Psychotic Disorder Father         bipolar       Social History:  Marital Status:  Single [1]  Social History     Tobacco Use     Smoking status: Former Smoker     Packs/day: 0.00     Smokeless tobacco: Never Used   Substance Use Topics     Alcohol use: Yes     Alcohol/week: 0.0 standard drinks     Comment: occ     Drug use: Yes     Types: Methamphetamines     Comment: medical marijuana, heroin        Medications:    amitriptyline (ELAVIL) 10 MG tablet  SUMAtriptan (IMITREX) 25 MG tablet  zolpidem (AMBIEN) 10 MG tablet  ibuprofen (ADVIL/MOTRIN) 200 MG tablet  Pantoprazole Sodium (PROTONIX PO)          Review of Systems   Constitutional: Negative for fever.   Gastrointestinal: Positive for diarrhea (x 5 today,  "last an hour ago) and nausea (only with the headache).   All other systems reviewed and are negative.      Physical Exam   BP: (!) 137/98  Pulse: 89  Temp: 97.8  F (36.6  C)  Resp: 16  Height: 175.3 cm (5' 9\")  Weight: 88.5 kg (195 lb)  SpO2: 99 %      Physical Exam  Constitutional:       General: He is not in acute distress.     Appearance: Normal appearance.   HENT:      Head: Normocephalic and atraumatic.      Nose: Nose normal.   Eyes:      Extraocular Movements: Extraocular movements intact.      Pupils: Pupils are equal, round, and reactive to light.      Comments: Pupils 2-3 mm   Neck:      Comments: Moderate tenderness at the left occiput and down into the paraspinous musculature on the left.  Cardiovascular:      Rate and Rhythm: Normal rate and regular rhythm.   Pulmonary:      Effort: Pulmonary effort is normal.      Breath sounds: Normal breath sounds.   Musculoskeletal: Normal range of motion.   Skin:     General: Skin is warm and dry.      Findings: No erythema.      Comments: No piloerection   Neurological:      General: No focal deficit present.      Mental Status: He is alert and oriented to person, place, and time.   Psychiatric:         Mood and Affect: Mood normal.         ED Course  (with Medical Decision Making)    19-year-old with left-sided neck pain and headache starting on Saturday morning when he woke up down in [facility] after flying down there the day before.  He was seen in the ED twice a down in [place] and had a negative head CT and negative lumbar puncture.  Was seen in our ED last night and had good relief from the headache protocol.  Headache came back today and did not respond to Imitrex x2.  He also took his Ambien along with some Benadryl and ibuprofen.  He is concerned that he might be going through heroin withdrawal and is on day 6.  Has had some diarrhea today but no piloerection and his pupils are normal in size.  He is probably through the worst of his " withdrawal.      1 L NS  Magnesium Sulfate 1 gm IV  Benadry 25mg IV  Reglan 10mg IV  Toradol 30mg IV    His headache improved markedly and he felt ready to go home.  Still some slight tenderness in the left occiput at the insertion of the paraspinous musculature.  He agreed to trigger point injections which were administered with bupivacaine 0.5% plain.  Total of 3 cc in 2 different spots was given with further improvement in his pain.  He thinks he can make it at home.  He is wondering about tomorrow.  We discussed low-dose amitriptyline and will start with 10 mg and ramp up the dose as tolerated/needed.  I asked him to recheck in clinic in the next 2 weeks for reevaluation and dosage adjustment if needed.  I strongly encouraged him to abstain from opiates as he has gone through the worst of his withdrawal and would just have to go through that again if he reuses.  He seems motivated to abstain.  I suggested that he check with his insurance company in regards to CD treatment to see where he gets the best coverage.  He is in agreement with this plan and feels comfortable going home.                 Procedures               Critical Care time:  none               No results found for this or any previous visit (from the past 24 hour(s)).    Medications   0.9% sodium chloride BOLUS (0 mLs Intravenous Stopped 11/13/19 2212)     Followed by   sodium chloride 0.9% infusion (has no administration in time range)   bupivacaine (MARCAINE) 0.5% preservative free injection (has no administration in time range)   diphenhydrAMINE (BENADRYL) injection 25 mg (50 mg Intravenous Given 11/13/19 2130)   magnesium sulfate 1 gm in 100mL D5W intermittent infusion (0 g Intravenous Stopped 11/13/19 2157)   ketorolac (TORADOL) injection 30 mg (30 mg Intravenous Given 11/13/19 2132)   metoclopramide (REGLAN) injection 10 mg (10 mg Intravenous Given 11/13/19 2133)       Assessments & Plan     I have reviewed the nursing notes.    I have  reviewed the findings, diagnosis, plan and need for follow up with the patient.       Discharge Medication List as of 11/13/2019 11:17 PM      START taking these medications    Details   amitriptyline (ELAVIL) 10 MG tablet 1 tab at bedtime.  May increase by 10 mg every 4-7 days until pain controlled or side effects limit., Disp-60 tablet, R-0, E-Prescribe             Final diagnoses:   Mixed headache   Heroin withdrawal (H)       11/13/2019   [facility] EMERGENCY DEPARTMENT     Rico Steinberg MD  11/13/19 6811

## 2019-11-14 NOTE — ED TRIAGE NOTES
Did state he got hit in the head from a door while at work and was seen in the ED for that a couple weeks ago.

## 2019-11-14 NOTE — ED TRIAGE NOTES
Presents with neck pain and stiffness that has been for 4 days and has tried everything he has been given from his lasts visits.  He thinks now he might be withdrawing from smoking heroin that he used for everyday for about 1 month.  Last time was about 1 week ago. Not sleeping and cannot get comfortable.     Today has benadryl, ibuprofen, imitrex and ambien.

## 2019-11-15 ENCOUNTER — TELEPHONE (OUTPATIENT)
Dept: FAMILY MEDICINE | Facility: OTHER | Age: 19
End: 2019-11-15

## 2019-11-15 NOTE — TELEPHONE ENCOUNTER
Patient was scheduled for an appointment on 11/13/19 which he did now show up for. I called and scheduled him for an appointment next week. I cannot talk to his mother because we do not have C2C on file to discuss with her.    Yanet Dickey MA     11/15/2019

## 2019-11-15 NOTE — TELEPHONE ENCOUNTER
Reason for Call:  Same Day Appointment, Requested Provider:  Alhaji Madison DO    PCP: Alhaji Madison    Reason for visit: ED follow up/ Anxiety Referral       Duration of symptoms:      Have you been treated for this in the past?      Additional comments:  Rosaura Molina (mom) calling stating patient has been seen in ED 5 times in the last week for Migraines and neck strain. Looking to get an ED follow up scheduled. States she had thought there was already a message sent in but Aaron cannot remember if he heard back or not. We do not have a C2C on file to talk with Rosaura Molina. Please advise on work in message. Was supposed to be seen by Monday the 11th according to ED/ Rosaura Molina also states he needs a referral to be seen for anxiety as well. Please advise         Can we leave a detailed message on this number? Not Applicable    Phone number patient can be reached at: Home number on file [phone] (home)    Best Time:      Call taken on 11/15/2019 at 2:26 PM by Laine Asif